# Patient Record
Sex: MALE | Race: WHITE | NOT HISPANIC OR LATINO | Employment: STUDENT | URBAN - METROPOLITAN AREA
[De-identification: names, ages, dates, MRNs, and addresses within clinical notes are randomized per-mention and may not be internally consistent; named-entity substitution may affect disease eponyms.]

---

## 2017-08-18 ENCOUNTER — TRANSFERRED RECORDS (OUTPATIENT)
Dept: HEALTH INFORMATION MANAGEMENT | Facility: CLINIC | Age: 27
End: 2017-08-18

## 2017-10-03 ENCOUNTER — TRANSFERRED RECORDS (OUTPATIENT)
Dept: HEALTH INFORMATION MANAGEMENT | Facility: CLINIC | Age: 27
End: 2017-10-03

## 2017-11-03 ENCOUNTER — TRANSFERRED RECORDS (OUTPATIENT)
Dept: HEALTH INFORMATION MANAGEMENT | Facility: CLINIC | Age: 27
End: 2017-11-03

## 2018-01-03 ENCOUNTER — TRANSFERRED RECORDS (OUTPATIENT)
Dept: HEALTH INFORMATION MANAGEMENT | Facility: CLINIC | Age: 28
End: 2018-01-03

## 2018-01-25 ENCOUNTER — TRANSFERRED RECORDS (OUTPATIENT)
Dept: HEALTH INFORMATION MANAGEMENT | Facility: CLINIC | Age: 28
End: 2018-01-25

## 2018-05-17 ENCOUNTER — OFFICE VISIT (OUTPATIENT)
Dept: INTERNAL MEDICINE | Facility: CLINIC | Age: 28
End: 2018-05-17
Payer: COMMERCIAL

## 2018-05-17 ENCOUNTER — RADIANT APPOINTMENT (OUTPATIENT)
Dept: GENERAL RADIOLOGY | Facility: CLINIC | Age: 28
End: 2018-05-17
Attending: NURSE PRACTITIONER
Payer: COMMERCIAL

## 2018-05-17 VITALS
HEART RATE: 74 BPM | TEMPERATURE: 98 F | BODY MASS INDEX: 20.79 KG/M2 | OXYGEN SATURATION: 97 % | RESPIRATION RATE: 16 BRPM | WEIGHT: 156.9 LBS | HEIGHT: 73 IN | DIASTOLIC BLOOD PRESSURE: 72 MMHG | SYSTOLIC BLOOD PRESSURE: 100 MMHG

## 2018-05-17 DIAGNOSIS — M79.672 LEFT FOOT PAIN: ICD-10-CM

## 2018-05-17 DIAGNOSIS — Z00.00 HEALTHCARE MAINTENANCE: Primary | ICD-10-CM

## 2018-05-17 DIAGNOSIS — M25.542 ARTHRALGIA OF BOTH HANDS: ICD-10-CM

## 2018-05-17 DIAGNOSIS — M25.541 ARTHRALGIA OF BOTH HANDS: ICD-10-CM

## 2018-05-17 DIAGNOSIS — B36.0 TINEA VERSICOLOR: ICD-10-CM

## 2018-05-17 DIAGNOSIS — M25.552 HIP PAIN, LEFT: ICD-10-CM

## 2018-05-17 LAB
CRP SERPL-MCNC: <2.9 MG/L (ref 0–8)
ERYTHROCYTE [DISTWIDTH] IN BLOOD BY AUTOMATED COUNT: 12.3 % (ref 10–15)
HCT VFR BLD AUTO: 41.5 % (ref 40–53)
HGB BLD-MCNC: 14.3 G/DL (ref 13.3–17.7)
MCH RBC QN AUTO: 31.2 PG (ref 26.5–33)
MCHC RBC AUTO-ENTMCNC: 34.5 G/DL (ref 31.5–36.5)
MCV RBC AUTO: 91 FL (ref 78–100)
PLATELET # BLD AUTO: 198 10E9/L (ref 150–450)
RBC # BLD AUTO: 4.58 10E12/L (ref 4.4–5.9)
WBC # BLD AUTO: 3.6 10E9/L (ref 4–11)

## 2018-05-17 PROCEDURE — 80061 LIPID PANEL: CPT | Performed by: NURSE PRACTITIONER

## 2018-05-17 PROCEDURE — 80048 BASIC METABOLIC PNL TOTAL CA: CPT | Performed by: NURSE PRACTITIONER

## 2018-05-17 PROCEDURE — 36415 COLL VENOUS BLD VENIPUNCTURE: CPT | Performed by: NURSE PRACTITIONER

## 2018-05-17 PROCEDURE — 99213 OFFICE O/P EST LOW 20 MIN: CPT | Mod: 25 | Performed by: NURSE PRACTITIONER

## 2018-05-17 PROCEDURE — 99385 PREV VISIT NEW AGE 18-39: CPT | Performed by: NURSE PRACTITIONER

## 2018-05-17 PROCEDURE — 86431 RHEUMATOID FACTOR QUANT: CPT | Performed by: NURSE PRACTITIONER

## 2018-05-17 PROCEDURE — 73630 X-RAY EXAM OF FOOT: CPT | Mod: LT

## 2018-05-17 PROCEDURE — 73130 X-RAY EXAM OF HAND: CPT | Mod: LT

## 2018-05-17 PROCEDURE — 84443 ASSAY THYROID STIM HORMONE: CPT | Performed by: NURSE PRACTITIONER

## 2018-05-17 PROCEDURE — 86140 C-REACTIVE PROTEIN: CPT | Performed by: NURSE PRACTITIONER

## 2018-05-17 PROCEDURE — 85027 COMPLETE CBC AUTOMATED: CPT | Performed by: NURSE PRACTITIONER

## 2018-05-17 RX ORDER — KETOCONAZOLE 20 MG/ML
SHAMPOO TOPICAL
Qty: 120 ML | Refills: 1 | Status: SHIPPED | OUTPATIENT
Start: 2018-05-17

## 2018-05-17 NOTE — LETTER
May 21, 2018      Ricky Campoverde  [address] [facility] [address]        Dear ,    We are writing to inform you of your test results.    Your recent lab results and xrays of hands and foot were normal.  Please have MRI of hip forwarded to us for any further evaluation.     Resulted Orders   CBC with platelets   Result Value Ref Range    WBC 3.6 (L) 4.0 - 11.0 10e9/L    RBC Count 4.58 4.4 - 5.9 10e12/L    Hemoglobin 14.3 13.3 - 17.7 g/dL    Hematocrit 41.5 40.0 - 53.0 %    MCV 91 78 - 100 fl    MCH 31.2 26.5 - 33.0 pg    MCHC 34.5 31.5 - 36.5 g/dL    RDW 12.3 10.0 - 15.0 %    Platelet Count 198 150 - 450 10e9/L      Comment:      Results confirmed by repeat test   Basic metabolic panel   Result Value Ref Range    Sodium 140 133 - 144 mmol/L    Potassium 4.0 3.4 - 5.3 mmol/L    Chloride 106 94 - 109 mmol/L    Carbon Dioxide 28 20 - 32 mmol/L    Anion Gap 6 3 - 14 mmol/L    Glucose 88 70 - 99 mg/dL    Urea Nitrogen 10 7 - 30 mg/dL    Creatinine 0.82 0.66 - 1.25 mg/dL    GFR Estimate >90 >60 mL/min/1.7m2      Comment:      Non  GFR Calc    GFR Estimate If Black >90 >60 mL/min/1.7m2      Comment:       GFR Calc    Calcium 9.2 8.5 - 10.1 mg/dL   Lipid Profile   Result Value Ref Range    Cholesterol 135 <200 mg/dL    Triglycerides 42 <150 mg/dL    HDL Cholesterol 58 >39 mg/dL    LDL Cholesterol Calculated 69 <100 mg/dL      Comment:      Desirable:       <100 mg/dl    Non HDL Cholesterol 77 <130 mg/dL   TSH with free T4 reflex   Result Value Ref Range    TSH 1.29 0.40 - 4.00 mU/L   Rheumatoid factor   Result Value Ref Range    Rheumatoid Factor <20 <20 IU/mL   CRP inflammation   Result Value Ref Range    CRP Inflammation <2.9 0.0 - 8.0 mg/L       If you have any questions or concerns, please call the clinic at the number listed above.       Sincerely,        Kristan Caro NP

## 2018-05-17 NOTE — PROGRESS NOTES
SUBJECTIVE:   CC: Ricky Campoverde is an 27 year old male who presents for preventative health visit.     Physical   Annual:     Getting at least 3 servings of Calcium per day::  Yes    Bi-annual eye exam::  NO    Dental care twice a year::  NO    Sleep apnea or symptoms of sleep apnea::  None    Diet::  Regular (no restrictions)    Frequency of exercise::  2-3 days/week    Duration of exercise::  30-45 minutes    Taking medications regularly::  Yes    Medication side effects::  None    Additional concerns today::  YES                Musculoskeletal problem/pain      Duration: 2-3 years    Description  Location: R hip and L foot and bilateral hands    Intensity:  mild, moderate    Accompanying signs and symptoms: none    History  Previous similar problem: YES  Previous evaluation:  MRI R hip and orthopedic evaluation in [place], labral tear    Precipitating or alleviating factors:  Trauma or overuse: no   Aggravating factors include: none    Therapies tried and outcome: nothing      Today's PHQ-2 Score:   PHQ-2 ( 1999 [employer]) 5/17/2018   Q1: Little interest or pleasure in doing things 0   Q2: Feeling down, depressed or hopeless 1   PHQ-2 Score 1   Q1: Little interest or pleasure in doing things Not at all   Q2: Feeling down, depressed or hopeless Several days   PHQ-2 Score 1       Abuse: Current or Past(Physical, Sexual or Emotional)- No  Do you feel safe in your environment - Yes    Social History   Substance Use Topics     Smoking status: Former Smoker     Types: Cigars     Smokeless tobacco: Former User      Comment: occas cigar     Alcohol use Yes      Comment: occas     Alcohol Use 5/17/2018   If you drink alcohol do you typically have greater than 3 drinks per day OR greater than 7 drinks per week? No       Last PSA: No results found for: PSA    Reviewed orders with patient. Reviewed health maintenance and updated orders accordingly - Yes  BP Readings from Last 3 Encounters:   05/17/18 100/72   08/22/12 100/56    07/05/12 120/62    Wt Readings from Last 3 Encounters:   05/17/18 156 lb 14.4 oz (71.2 kg)   08/22/12 159 lb (72.1 kg)   07/05/12 163 lb (73.9 kg)                  Patient Active Problem List   Diagnosis     CARDIOVASCULAR SCREENING; LDL GOAL LESS THAN 160     Contact dermatitis due to poison ivy     Tinea versicolor     Joint pain     Left foot pain     Hip pain, left     Past Surgical History:   Procedure Laterality Date     NO HISTORY OF SURGERY         Social History   Substance Use Topics     Smoking status: Former Smoker     Types: Cigars     Smokeless tobacco: Former User      Comment: occas cigar     Alcohol use Yes      Comment: 2 drinks 2-3 x weekly     Family History   Problem Relation Age of Onset     Hypertension Maternal Grandmother      Cancer - colorectal Maternal Grandmother      Hypertension Maternal Grandfather      CANCER Paternal Grandmother      OSTEOPOROSIS Paternal Grandmother      CANCER Paternal Grandfather      Cardiovascular Paternal Grandfather          Current Outpatient Prescriptions   Medication Sig Dispense Refill     ketoconazole (NIZORAL) 2 % shampoo Apply to the affected area and wash off after 5 minutes. 120 mL 1     NO ACTIVE MEDICATIONS   0       Reviewed and updated as needed this visit by clinical staff  Tobacco  Allergies  Meds  Med Hx  Surg Hx  Fam Hx  Soc Hx        Reviewed and updated as needed this visit by Provider            Review of Systems  CONSTITUTIONAL: NEGATIVE for fever, chills, change in weight  INTEGUMENTARY/SKIN: POSITIVE for h/o tinea vesicolor  ENT: NEGATIVE for ear, mouth and throat problems  RESP: NEGATIVE for significant cough or SOB  CV: NEGATIVE for chest pain, palpitations or peripheral edema  GI: NEGATIVE for nausea, abdominal pain, heartburn, or change in bowel habits   male: negative for dysuria, hematuria, decreased urinary stream, erectile dysfunction, urethral discharge  NEURO: NEGATIVE for weakness, dizziness or  "paresthesias  PSYCHIATRIC: NEGATIVE for changes in mood or affect    OBJECTIVE:   /72 (BP Location: Right arm, Patient Position: Sitting, Cuff Size: Adult Large)  Pulse 74  Temp 98  F (36.7  C) (Oral)  Resp 16  Ht 6' 0.5\" (1.842 m)  Wt 156 lb 14.4 oz (71.2 kg)  SpO2 97%  BMI 20.99 kg/m2    Physical Exam  GENERAL: healthy, alert and no distress  NECK: no adenopathy, no asymmetry, masses, or scars and thyroid normal to palpation  RESP: lungs clear to auscultation - no rales, rhonchi or wheezes  CV: regular rate and rhythm, normal S1 S2, no S3 or S4, no murmur, click or rub, no peripheral edema and peripheral pulses strong  ABDOMEN: soft, nontender, no hepatosplenomegaly, no masses and bowel sounds normal  MS: no gross musculoskeletal defects noted, no edema  MS: no synovitis bilat hands, fingers  SKIN: no suspicious lesions or rashes    ASSESSMENT/PLAN:       ICD-10-CM    1. Healthcare maintenance Z00.00 CBC with platelets     Basic metabolic panel     Lipid Profile   2. Arthralgia of both hands M25.541 TSH with free T4 reflex    M25.542 Rheumatoid factor     CRP inflammation     XR Hand Bilateral G/E 3 Views   3. Left foot pain M79.672 Rheumatoid factor     XR Foot Left G/E 3 Views   4. Hip pain, left M25.552    5. Tinea versicolor B36.0 ketoconazole (NIZORAL) 2 % shampoo       COUNSELING:   Reviewed preventive health counseling, as reflected in patient instructions         reports that he has quit smoking. His smoking use included Cigars. He has quit using smokeless tobacco.    Estimated body mass index is 20.99 kg/(m^2) as calculated from the following:    Height as of this encounter: 6' 0.5\" (1.842 m).    Weight as of this encounter: 156 lb 14.4 oz (71.2 kg).       Counseling Resources:  ATP IV Guidelines  Pooled Cohorts Equation Calculator  FRAX Risk Assessment  ICSI Preventive Guidelines  Dietary Guidelines for Americans, 2010  USDA's MyPlate  ASA Prophylaxis  Lung CA Screening    Kristan Garcia " STARR Caro  [facility]  Answers for HPI/ROS submitted by the patient on 5/17/2018   PHQ-2 Score: 1

## 2018-05-17 NOTE — MR AVS SNAPSHOT
After Visit Summary   5/17/2018    Ricky Campoverde    MRN: [id number]           Patient Information     Date Of Birth          1990        Visit Information        Provider Department      5/17/2018 9:40 AM Kristan Caro NP [facility]        Today's Diagnoses     Healthcare maintenance    -  1    Arthralgia of both hands        Left foot pain        Hip pain, left        Tinea versicolor          Care Instructions      Preventive Health Recommendations  Male Ages 26 - 39    Yearly exam:             See your health care provider every year in order to  o   Review health changes.   o   Discuss preventive care.    o   Review your medicines if your doctor has prescribed any.    You should be tested each year for STDs (sexually transmitted diseases), if you re at risk.     After age 35, talk to your provider about cholesterol testing. If you are at risk for heart disease, have your cholesterol tested at least every 5 years.     If you are at risk for diabetes, you should have a diabetes test (fasting glucose).  Shots: Get a flu shot each year. Get a tetanus shot every 10 years.     Nutrition:    Eat at least 5 servings of fruits and vegetables daily.     Eat whole-grain bread, whole-wheat pasta and brown rice instead of white grains and rice.     Talk to your provider about Calcium and Vitamin D.     Lifestyle    Exercise for at least 150 minutes a week (30 minutes a day, 5 days a week). This will help you control your weight and prevent disease.     Limit alcohol to one drink per day.     No smoking.     Wear sunscreen to prevent skin cancer.     See your dentist every six months for an exam and cleaning.             Follow-ups after your visit        Future tests that were ordered for you today     Open Future Orders        Priority Expected Expires Ordered    XR Foot Left G/E 3 Views Routine 5/17/2018 5/17/2019 5/17/2018    XR Hand Bilateral G/E 3 Views Routine 5/17/2018  "5/17/2019 5/17/2018            Who to contact     If you have questions or need follow up information about today's clinic visit or your schedule please contact [facility] directly at [phone].  Normal or non-critical lab and imaging results will be communicated to you by MyChart, letter or phone within 4 business days after the clinic has received the results. If you do not hear from us within 7 days, please contact the clinic through [employer] or phone. If you have a critical or abnormal lab result, we will notify you by phone as soon as possible.  Submit refill requests through [employer] or call your pharmacy and they will forward the refill request to us. Please allow 3 business days for your refill to be completed.          Additional Information About Your Visit        [employer] Information     [employer] gives you secure access to your electronic health record. If you see a primary care provider, you can also send messages to your care team and make appointments. If you have questions, please call your primary care clinic.  If you do not have a primary care provider, please call [phone] and they will assist you.        Care EveryWhere ID     This is your Care EveryWhere ID. This could be used by other organizations to access your [place] medical records  [id number]        Your Vitals Were     Pulse Temperature Respirations Height Pulse Oximetry BMI (Body Mass Index)    74 98  F (36.7  C) (Oral) 16 6' 0.5\" (1.842 m) 97% 20.99 kg/m2       Blood Pressure from Last 3 Encounters:   05/17/18 100/72   08/22/12 100/56   07/05/12 120/62    Weight from Last 3 Encounters:   05/17/18 156 lb 14.4 oz (71.2 kg)   08/22/12 159 lb (72.1 kg)   07/05/12 163 lb (73.9 kg)              We Performed the Following     Basic metabolic panel     CBC with platelets     CRP inflammation     Lipid Profile     Rheumatoid factor     TSH with free T4 reflex          Today's Medication Changes          These changes are " accurate as of 5/17/18 10:32 AM.  If you have any questions, ask your nurse or doctor.               Start taking these medicines.        Dose/Directions    ketoconazole 2 % shampoo   Commonly known as:  NIZORAL   Used for:  Tinea versicolor   Started by:  Kristan Caro, STARR        Apply to the affected area and wash off after 5 minutes.   Quantity:  120 mL   Refills:  1            Where to get your medicines      These medications were sent to [facility] [address] [place] [address] [id number] [place] [address]  [address] [facility] [address]     Phone:  [phone]     ketoconazole 2 % shampoo                Primary Care Provider Office Phone # Fax #    Luke Priest [phone] [address] [facility] [address]        Equal Access to Services     [facility]: Hadii robyn ku hadasho Soomaali, waaxda luqadaha, qaybta kaalmada adeegyada, waxay antoniin hayivyn dian beltran . So [facility] [phone].    ATENCIÓN: Si habla español, tiene a orozco disposición servicios gratuitos de asistencia lingüística. Llame al [phone].    We comply with applicable federal civil rights laws and [place] laws. We do not discriminate on the basis of race, color, national origin, age, disability, sex, sexual orientation, or gender identity.            Thank you!     Thank you for choosing [facility]  for your care. Our goal is always to provide you with excellent care. Hearing back from our patients is one way we can continue to improve our services. Please take a few minutes to complete the written survey that you may receive in the mail after your visit with us. Thank you!             Your Updated Medication List - Protect others around you: Learn how to safely use, store and throw away your medicines at www.disposemymeds.org.          This list is accurate as of 5/17/18 10:32 AM.  Always use your most recent med list.                    Brand Name Dispense Instructions for use Diagnosis    ketoconazole 2 % shampoo    NIZORAL    120 mL    Apply to the affected area and wash off after 5 minutes.    Tinea versicolor       NO ACTIVE MEDICATIONS

## 2018-05-18 LAB
ANION GAP SERPL CALCULATED.3IONS-SCNC: 6 MMOL/L (ref 3–14)
BUN SERPL-MCNC: 10 MG/DL (ref 7–30)
CALCIUM SERPL-MCNC: 9.2 MG/DL (ref 8.5–10.1)
CHLORIDE SERPL-SCNC: 106 MMOL/L (ref 94–109)
CHOLEST SERPL-MCNC: 135 MG/DL
CO2 SERPL-SCNC: 28 MMOL/L (ref 20–32)
CREAT SERPL-MCNC: 0.82 MG/DL (ref 0.66–1.25)
GFR SERPL CREATININE-BSD FRML MDRD: >90 ML/MIN/1.7M2
GLUCOSE SERPL-MCNC: 88 MG/DL (ref 70–99)
HDLC SERPL-MCNC: 58 MG/DL
LDLC SERPL CALC-MCNC: 69 MG/DL
NONHDLC SERPL-MCNC: 77 MG/DL
POTASSIUM SERPL-SCNC: 4 MMOL/L (ref 3.4–5.3)
RHEUMATOID FACT SER NEPH-ACNC: <20 IU/ML (ref 0–20)
SODIUM SERPL-SCNC: 140 MMOL/L (ref 133–144)
TRIGL SERPL-MCNC: 42 MG/DL
TSH SERPL DL<=0.005 MIU/L-ACNC: 1.29 MU/L (ref 0.4–4)

## 2018-05-23 ENCOUNTER — OFFICE VISIT (OUTPATIENT)
Dept: FAMILY MEDICINE | Facility: CLINIC | Age: 28
End: 2018-05-23
Payer: COMMERCIAL

## 2018-05-23 VITALS
DIASTOLIC BLOOD PRESSURE: 80 MMHG | SYSTOLIC BLOOD PRESSURE: 120 MMHG | OXYGEN SATURATION: 99 % | HEART RATE: 74 BPM | HEIGHT: 73 IN

## 2018-05-23 DIAGNOSIS — H00.012 HORDEOLUM EXTERNUM OF RIGHT LOWER EYELID: Primary | ICD-10-CM

## 2018-05-23 PROCEDURE — 67700 BLEPHAROTOMY DRG ABSC EYELID: CPT | Mod: 52 | Performed by: FAMILY MEDICINE

## 2018-05-23 RX ORDER — POLYMYXIN B SULFATE AND TRIMETHOPRIM 1; 10000 MG/ML; [USP'U]/ML
1 SOLUTION OPHTHALMIC
Qty: 1 BOTTLE | Refills: 0 | Status: SHIPPED | OUTPATIENT
Start: 2018-05-23 | End: 2018-06-14

## 2018-05-23 NOTE — LETTER
5/23/2018         RE: Ricky Campoverde  [address]  [place] [address]        Dear Colleague,    Thank you for referring your patient, Ricky Campoverde, to the [facility]. Please see a copy of my visit note below.    [facility] - PRIMARY CARE SKIN    CC : Lesion(s)  SUBJECTIVE:                                                    Ricky Campoverde is a 27 year old male who presents to clinic today because of a stye on the right eye beginning May 8th. It is enlarging and becoming more painful. He has been applying warm washcloths twice daily since onset. He denies any change of vision beyond obstruction from the stye.    His father's wedding is in four days, and he requests treatment.  Sister has also had a stye in the past with lancing treatment.    Personal history of skin cancer : NO.  Family history of skin cancer : NO.  Autoimmune : ? Lupus in mother    Occupation : freelance  (both indoor & outdoor).    Refer to electronic medical record (EMR) for past medical history and medications.    INTEGUMENTARY/SKIN: POSITIVE for changing lesion  EYES: NEGATIVE for vision changes  ROS : 14 point review of systems was negative except the symptoms listed above in the HPI.    This document serves as a record of the services and decisions personally performed and made by Nieves Posada MD. It was created on her behalf by Saroj Morley, a trained medical scribe.  The creation of this document is based on the scribe's personal observations and the provider's statements to the medical scribe.  Saroj Morley, May 23, 2018 3:49 PM      OBJECTIVE:                                                    GENERAL: healthy, alert and no distress  SKIN: Nunez Skin Type - II.  Face were examined. The dermatoscope was used to help evaluate pigmented lesions.  Skin Pertinent Findings:  Right eye : 7 mm in size, raised, erythematous lesion on right medial lower eyelid consistent with stye.  Name :  Incision and drainage  Location(s) : right eye.  Completed by : Nieves Posada MD  Note : Discussed risks of the excision procedure, including pain, infection, scarring, hypopigmentation, hyperpigmentation and potential for recurrence or need for re-treatment. Discussed that definitive removal may require referral to plastic surgeon. Benefits of treatment and alternative treatments were also discussed.    During this procedure, the universal protocol was utilized. The patient's identity was confirmed by no less than two patient identifiers, correct procedure was verified, correct site was verified and marked as applicable and a final pause was completed.    Sterile technique was used throughout the procedure. The skin was cleaned and prepped with alcohol. Stab incision was performed with an #11 blade. White purulent drainage was expressed.     Direct pressure was applied for hemostasis. No bleeding was present upon the completion of the procedure. Gauze was applied. Patient tolerated the procedure well and was discharged in satisfactory condition.    Diagnostic Test Results:  none     MDM\: because of his father's wedding in 5 days, the stye was drained.      ASSESSMENT:                                                      Encounter Diagnosis   Name Primary?     Hordeolum externum of right lower eyelid Yes          PLAN:                                                    Patient Instructions   FUTURE APPOINTMENTS  Follow up as needed.    Continue with warm compresses 10-15 minutes a day.    TOPICAL ANTIBIOTIC  Apply 1 drop of trimethoprim-polymyxin B (Polytrim) every 3-4 hours for 7 days to right eye.        PROCEDURES:                                                    None.    TT : 20 minutes.  CT : 15 minutes.      The information in this document, created by the medical scribe for me, accurately reflects the services I personally performed and the decisions made by me. I have reviewed and approved this document  for accuracy prior to leaving the patient care area.  Nieves Posada MD May 23, 2018 3:49 PM  [facility]    Again, thank you for allowing me to participate in the care of your patient.        Sincerely,        Blessing Posada MD

## 2018-05-23 NOTE — PATIENT INSTRUCTIONS
FUTURE APPOINTMENTS  Follow up as needed.    Continue with warm compresses 10-15 minutes a day.    TOPICAL ANTIBIOTIC  Apply 1 drop of trimethoprim-polymyxin B (Polytrim) every 3-4 hours for 7 days to right eye.

## 2018-05-23 NOTE — PROGRESS NOTES
[facility] - PRIMARY CARE SKIN    CC : Lesion(s)  SUBJECTIVE:                                                    Ricky Campoverde is a 27 year old male who presents to clinic today because of a stye on the right eye beginning May 8th. It is enlarging and becoming more painful. He has been applying warm washcloths twice daily since onset. He denies any change of vision beyond obstruction from the stye.    His father's wedding is in four days, and he requests treatment.  Sister has also had a stye in the past with lancing treatment.    Personal history of skin cancer : NO.  Family history of skin cancer : NO.  Autoimmune : ? Lupus in mother    Occupation : freelance  (both indoor & outdoor).    Refer to electronic medical record (EMR) for past medical history and medications.    INTEGUMENTARY/SKIN: POSITIVE for changing lesion  EYES: NEGATIVE for vision changes  ROS : 14 point review of systems was negative except the symptoms listed above in the HPI.    This document serves as a record of the services and decisions personally performed and made by Nieves Posada MD. It was created on her behalf by Saroj Morley, a trained medical scribe.  The creation of this document is based on the scribe's personal observations and the provider's statements to the medical scribe.  Saroj Morley, May 23, 2018 3:49 PM      OBJECTIVE:                                                    GENERAL: healthy, alert and no distress  SKIN: Nunez Skin Type - II.  Face were examined. The dermatoscope was used to help evaluate pigmented lesions.  Skin Pertinent Findings:  Right eye : 7 mm in size, raised, erythematous lesion on right medial lower eyelid consistent with stye.  Name : Incision and drainage  Location(s) : right eye.  Completed by : Nieves Posada MD  Note : Discussed risks of the excision procedure, including pain, infection, scarring, hypopigmentation, hyperpigmentation and potential for recurrence or need for  re-treatment. Discussed that definitive removal may require referral to plastic surgeon. Benefits of treatment and alternative treatments were also discussed.    During this procedure, the universal protocol was utilized. The patient's identity was confirmed by no less than two patient identifiers, correct procedure was verified, correct site was verified and marked as applicable and a final pause was completed.    Sterile technique was used throughout the procedure. The skin was cleaned and prepped with alcohol. Stab incision was performed with an #11 blade. White purulent drainage was expressed.     Direct pressure was applied for hemostasis. No bleeding was present upon the completion of the procedure. Gauze was applied. Patient tolerated the procedure well and was discharged in satisfactory condition.    Diagnostic Test Results:  none     MDM\: because of his father's wedding in 5 days, the stye was drained.      ASSESSMENT:                                                      Encounter Diagnosis   Name Primary?     Hordeolum externum of right lower eyelid Yes          PLAN:                                                    Patient Instructions   FUTURE APPOINTMENTS  Follow up as needed.    Continue with warm compresses 10-15 minutes a day.    TOPICAL ANTIBIOTIC  Apply 1 drop of trimethoprim-polymyxin B (Polytrim) every 3-4 hours for 7 days to right eye.        PROCEDURES:                                                    None.    TT : 20 minutes.  CT : 15 minutes.      The information in this document, created by the medical scribe for me, accurately reflects the services I personally performed and the decisions made by me. I have reviewed and approved this document for accuracy prior to leaving the patient care area.  Nieves Posada MD May 23, 2018 3:49 PM  [facility]

## 2018-05-23 NOTE — MR AVS SNAPSHOT
After Visit Summary   5/23/2018    Ricky Campoverde    MRN: [id number]           Patient Information     Date Of Birth          1990        Visit Information        Provider Department      5/23/2018 4:00 PM [facility]        Today's Diagnoses     Hordeolum externum of right lower eyelid    -  1      Care Instructions    FUTURE APPOINTMENTS  Follow up as needed.    Continue with warm compresses 10-15 minutes a day.    TOPICAL ANTIBIOTIC  Apply 1 drop of trimethoprim-polymyxin B (Polytrim) every 3-4 hours for 7 days to right eye.          Follow-ups after your visit        Who to contact     If you have questions or need follow up information about today's clinic visit or your schedule please contact [facility] directly at [phone].  Normal or non-critical lab and imaging results will be communicated to you by MyChart, letter or phone within 4 business days after the clinic has received the results. If you do not hear from us within 7 days, please contact the clinic through [employer] or phone. If you have a critical or abnormal lab result, we will notify you by phone as soon as possible.  Submit refill requests through [employer] or call your pharmacy and they will forward the refill request to us. Please allow 3 business days for your refill to be completed.          Additional Information About Your Visit        MyChart Information     [employer] gives you secure access to your electronic health record. If you see a primary care provider, you can also send messages to your care team and make appointments. If you have questions, please call your primary care clinic.  If you do not have a primary care provider, please call [phone] and they will assist you.        Care EveryWhere ID     This is your Care EveryWhere ID. This could be used by other organizations to access your [facility] medical records  [id number]        Your Vitals  "Were     Pulse Height Pulse Oximetry             74 6' 0.5\" (1.842 m) 99%          Blood Pressure from Last 3 Encounters:   05/23/18 120/80   05/17/18 100/72   08/22/12 100/56    Weight from Last 3 Encounters:   05/17/18 156 lb 14.4 oz (71.2 kg)   08/22/12 159 lb (72.1 kg)   07/05/12 163 lb (73.9 kg)              Today, you had the following     No orders found for display         Today's Medication Changes          These changes are accurate as of 5/23/18  4:05 PM.  If you have any questions, ask your nurse or doctor.               Start taking these medicines.        Dose/Directions    trimethoprim-polymyxin b ophthalmic solution   Commonly known as:  POLYTRIM   Used for:  Hordeolum externum of right lower eyelid   Started by:  Blessing Posada MD        Dose:  1 drop   Apply 1 drop to eye every 3 hours for 7 days   Quantity:  1 Bottle   Refills:  0            Where to get your medicines      These medications were sent to [facility] [address] [place] [id number]     Phone:  [phone]     trimethoprim-polymyxin b ophthalmic solution                Primary Care Provider Office Phone # Fax #    Luke Priest [phone] [address] [facility] [address]        Equal Access to Services     [facility]: Hadii aad ku hadasho Soomaali, waaxda luqadaha, qaybta kaalmada adeegyada, waxay shanelle haybrenden shepherd. So [facility] [phone].    ATENCIÓN: Si habla español, tiene a orozco disposición servicios gratuitos de asistencia lingüística. Llame al [phone].    We comply with applicable federal civil rights laws and [place] laws. We do not discriminate on the basis of race, color, national origin, age, disability, sex, sexual orientation, or gender identity.            Thank you!     Thank you for choosing [facility]  for your care. Our goal is always " to provide you with excellent care. Hearing back from our patients is one way we can continue to improve our services. Please take a few minutes to complete the written survey that you may receive in the mail after your visit with us. Thank you!             Your Updated Medication List - Protect others around you: Learn how to safely use, store and throw away your medicines at www.disposemymeds.org.          This list is accurate as of 5/23/18  4:05 PM.  Always use your most recent med list.                   Brand Name Dispense Instructions for use Diagnosis    ketoconazole 2 % shampoo    NIZORAL    120 mL    Apply to the affected area and wash off after 5 minutes.    Tinea versicolor       NO ACTIVE MEDICATIONS           trimethoprim-polymyxin b ophthalmic solution    POLYTRIM    1 Bottle    Apply 1 drop to eye every 3 hours for 7 days    Hordeolum externum of right lower eyelid

## 2018-05-29 ENCOUNTER — OFFICE VISIT (OUTPATIENT)
Dept: PODIATRY | Facility: CLINIC | Age: 28
End: 2018-05-29
Payer: COMMERCIAL

## 2018-05-29 ENCOUNTER — HOSPITAL ENCOUNTER (OUTPATIENT)
Dept: MRI IMAGING | Facility: CLINIC | Age: 28
Discharge: HOME | End: 2018-05-29
Attending: PODIATRIST | Admitting: PODIATRIST
Payer: COMMERCIAL

## 2018-05-29 VITALS
DIASTOLIC BLOOD PRESSURE: 62 MMHG | WEIGHT: 160 LBS | SYSTOLIC BLOOD PRESSURE: 120 MMHG | HEIGHT: 72 IN | BODY MASS INDEX: 21.67 KG/M2

## 2018-05-29 DIAGNOSIS — M77.52 TENDINITIS OF LEFT FOOT: ICD-10-CM

## 2018-05-29 DIAGNOSIS — L84 SKIN CALLUS: ICD-10-CM

## 2018-05-29 DIAGNOSIS — M79.672 LEFT FOOT PAIN: ICD-10-CM

## 2018-05-29 DIAGNOSIS — M79.672 LEFT FOOT PAIN: Primary | ICD-10-CM

## 2018-05-29 PROCEDURE — 73718 MRI LOWER EXTREMITY W/O DYE: CPT | Mod: LT

## 2018-05-29 PROCEDURE — 99203 OFFICE O/P NEW LOW 30 MIN: CPT | Performed by: PODIATRIST

## 2018-05-29 NOTE — PATIENT INSTRUCTIONS
Please call to schedule your MRI/CT/Ultrasound/Arthrogram appointment.  The number is [phone].    For EMG (Motor Nerve Testing):  Please call [facility]:  [phone], option #2    Thank you for choosing [facility]!    DR. ALVAREZ'S CLINIC SCHEDULE  MONDAY AM - [place] TUESDAY - [place]   [address] [address] [place] [address] [place] [address]   [phone] / [phone] / [phone]       WEDNESDAY - [place] FRIDAY AM - [facility]   [address]   [place] [address] [place] [address]   [phone] / [phone]       FRIDAY PM - [place] SCHEDULE SURGERY: [phone]   [address] BILLING QUESTIONS: [phone]   [place] [address] AFTER HOURS: [phone] / [phone] APPOINTMENTS: [phone]     [employer] (CPL) [phone]         TENDONITIS   Tendons are the strong fibrous portions ofmuscles that attach to bones and allow the muscle to move a joint when it contracts. Tendons are very strong because they have a lot of force exerted on them. Sometimes tendons can become painful because they have suffered an acute injury, in which too much force was exerted at one time, or an overuse injury, in which a normal force was exerted too frequently or over a prolonged period of time. As a result, there is damage to the tendon and its surrounding soft tissue structures and they become inflammed. Because tendons do not have a great blood supply, they do not heal rapidly and the inflammation can become chronic.   Conservative treatment for tendinitis involves rest and anti-inflammatory measures. Ice is applied 15 minutes 2-3 times daily. Anti-inflammatory medications called NSAIDs (ibuprofen, example) can be taken provided they are used with caution, as they can lead to internal bleeding and increase the risk ofstroke and  heart attack. Sometimes topical nitroglycerin is prescribed to help with pain. Often your doctor will use a special shoe or removable walking cast to immobilize the tendon, allowing it to heal without further damage from use. These devices are very useful in helping tendons heal, but they may slow you down or make you feel like your hip, knee, or back are out ofalignment. This is temporary and should go away once you are out ofthe immobilization. You should not use a walking cast when showering or driving. Another option is Platelet Rich Plasma injections. (Normally done with a Sports and Orthorapedic doctor.   If conservative measures fail, your physician may need to surgically repair the tendon by removing any chronic inflammatory tissue and sewing it back together. Sometimes it is sewn to an adjacent tendon with similar function for support and sometimes it is lengthened. . Sometimes the bones around the tendon need to be realigned or reshaped to better support the tendon or prevent further damage. Your foot and ankle surgeon will discuss the specifics of your surgery with you, should you need it.    Towel stretch: Sit on a hard surface with your injured leg stretched out in front of you. Loop a towel around your toes and the ball of your foot and pull the towel toward your body keeping your leg straight. Hold this position for 15 to 30 seconds and then relax. Repeat 3 times. Then push the towel away with the ball of your foot. Repeat 3 times.  When you don't feel much of a stretch using the towel, you can start the standing calf stretch and the following exercises.  Standing calf stretch: Stand facing a wall with your hands on the wall at about eye level. Keep your injured leg back with your heel on the floor. Keep the other leg forward with the knee bent. Turn your back foot slightly inward (as if you were pigeon-toed). Slowly lean into the wall until you feel a stretch in the back of your calf. Hold the  stretch for 15 to 30 seconds. Return to the starting position. Repeat 3 times. Do this exercise several times each day.   Standing soleus stretch: Stand facing a wall with your hands on the wall at about chest height. Keep your injured leg back with your heel on the floor. Keep the other leg forward with the knee bent. Turn your back foot slightly inward (as if you were pigeon-toed). Bend your back knee slightly and gently lean into the wall until you feel a stretch in the lower calf of your injured leg. Hold the stretch for 15 to 30 seconds. Return to the starting position. Repeat 3 times.   Achilles stretch: Stand with the ball of one foot on a stair. Reach for the step below with your heel until you feel a stretch in the arch of your foot. Hold this position for 15 to 30 seconds and then relax. Repeat 3 times.   Heel raise: Balance yourself while standing behind a chair or counter. Using the chair or counter as a support to help you, raise your body up onto your toes and hold for 5 seconds. Then slowly lower yourself down without holding onto the support. (It's OK to keep holding onto the support if you need to.) When this exercise becomes less painful, try lowering yourself down on the injured leg only. Repeat 15 times. Do 2 sets of 15. Rest 30 seconds between sets.   Step-up: Stand with the foot of your injured leg on a support 3 to 5 inches high (like a small step or block of wood). Keep your other foot flat on the floor. Shift your weight onto the injured leg on the support. Straighten your injured leg as the other leg comes off the floor. Return to the starting position by bending your injured leg and slowly lowering your uninjured leg back to the floor. Do 2 sets of 15.   Resisted ankle eversion: Sit with both legs stretched out in front of you, with your feet about a shoulder's width apart. Tie a loop in one end of elastic tubing. Put the foot of your injured leg through the loop so that the tubing goes  around the arch of that foot and wraps around the outside of the other foot. Hold onto the other end of the tubing with your hand to provide tension. Turn the foot of your injured leg up and out. Make sure you keep your other foot still so that it will allow the tubing to stretch as you move the foot of your injured leg. Return to the starting position. Do 2 sets of 15.   Balance and reach exercises: Stand next to a chair with your injured leg farther from the chair. The chair will provide support if you need it. Stand on the foot of your injured leg and bend your knee slightly. Try to raise the arch of this foot while keeping your big toe on the floor. Keep your foot in this position. With the hand that is farther away from the chair, reach forward in front of you by bending at the waist. Avoid bending your knee any more as you do this. Repeat this 10 times. To make the exercise more challenging, reach farther in front of you. Do 2 sets of 10.  the same position as above. While keeping your arch height, reach the hand that is farther away from the chair across your body toward the chair. The farther you reach, the more challenging the exercise. Do 2 sets of 10.     Resisted ankle eversion: Sit with both legs stretched out in front of you, with your feet about a shoulder's width apart. Tie a loop in one end of elastic tubing. Put the foot of your injured leg through the loop so that the tubing goes around the arch of that foot and wraps around the outside of the other foot. Hold onto the other end of the tubing with your hand to provide tension. Turn the foot of your injured leg up and out. Make sure you keep your other foot still so that it will allow the tubing to stretch as you move the foot of your injured leg. Return to the starting position. Do 2 sets of 15.   If you have access to a wobble board, do the following exercises:  Wobble board exercises:   Stand on a wobble board with your feet shoulder  width apart. Rock the board forwards and backwards 30 times, then side to side 30 times. Hold on to a chair if you need support.   Rotate the wobble board around so that the edge of the board is in contact with the floor at all times. Do this 30 times in a clockwise and then a counterclockwise direction.   Balance on the wobble board for as long as you can without letting the edges touch the floor. Try to do this for 2 minutes without touching the floor.   Rotate the wobble board in clockwise and counterclockwise circles, but do not let the edge of the board touch the floor.   When you have mastered exercises A through D, try repeating them while standing on just your injured leg.   After you are able to do these exercises on one leg, try to do them with your eyes closed. Make sure you have something nearby to support you in case you lose your balance.    CALLUS / CORNS / IPKs  When there is excessive friction or pressure on the skin, the body responds by making the skin thicker to protect the deeper structures from becoming exposed. While this works well to protect the deeper structures, the thickened skin can increase pressure and pain.    CALLUS: Flat, diffuse thickening are simple calluses and they are usually caused by friction. Often these are the result of rubbing on a shoe or going barefoot.    CORNS: Calluses with a central core between the toes are called corns. These result from prominent joints on adjacent toes rubbing together. Theses are a symptom of bone malalignment and will always recur unless the underlying bones are addressed surgically.    IPKs: Calluses with a central core on the ball of the foot are usually IPKs (intractable plantar keratosis). These are caused by excessive pressure from the metatarsals, the bones that make up the ball of the foot. Often one of these bones is too long or too prominent.  Again, these will always recur unless the underlying bone issue is addressed. There is no  cure for these. They will either go away by themselves, recur, or more could develop.    ROUTINE MAINTENANCE  1. File them down with a pumice stone or callus file a couple times a week.   2. An electric callus removing device. Amope Pedi Perfect Electronic Pedicure Foot File and Callus Remover can be a good option.   3. Lotion can be applied to soften the callus. A urea based cream such as Kersal or Vanicream or thicker cream with shea butter are good options.  4. Toe spacers or toe covers can be used for corns, gel pads can be used for other lesions on the bottom of the foot.   If there is a surgical pathology noted, such as a prominent bone, often this needs to be addressed surgically to minimize recurrence. However, sometimes the lesion simply migrates to another spot after surgery, so it is not a guaranteed cure.             Body Mass Index (BMI)  Many things can cause foot and ankle problems. Foot structure, activity level, foot mechanics and injuries are common causes of pain.  One very important issue that often goes unmentioned, is body weight. Extra weight can cause increased stress on muscles, ligaments, bones and tendons.  Sometimes just a few extra pounds is all it takes to put one over her/his threshold. Without reducing that stress, it can be difficult to alleviate pain. Some people are uncomfortable addressing this issue, but we feel it is important for you to think about it. As Foot &  Ankle specialists, our job is addressing the lower extremity problem and possible causes. Regarding extra body weight, we encourage patients to discuss diet and weight management plans with their primary care doctors. It is this team approach that gives you the best opportunity for pain relief and getting you back on your feet.

## 2018-05-29 NOTE — MR AVS SNAPSHOT
After Visit Summary   5/29/2018    Ricky Campoverde    MRN: [id number]           Patient Information     Date Of Birth          1990        Visit Information        Provider Department      5/29/2018 11:30 AM Marian Troncoso DPM, Podiatry/Foot and Ankle Surgery [facility]        Today's Diagnoses     Left foot pain    -  1    Tendinitis of left foot        Skin callus          Care Instructions    Please call to schedule your MRI/CT/Ultrasound/Arthrogram appointment.  The number is [phone].    For EMG (Motor Nerve Testing):  Please call [facility]:  [phone], option #2    Thank you for choosing [facility]!    DR. TRONCOSO'S CLINIC SCHEDULE  MONDAY AM - [place] TUESDAY - [place]   [address] [address] [address] [address] [place], [place] [id number] [phone] / [phone] / [phone]       WEDNESDAY - [place] FRIDAY AM - [facility]   [address]   [place] [address] [place] [address]   [phone] / [phone]       FRIDAY PM - [place] SCHEDULE SURGERY: [phone]   [address] BILLING QUESTIONS: [phone]   [place] [address] AFTER HOURS: [phone]   [phone] / [phone] APPOINTMENTS: [phone]     Consumer Price Line (CPL) [phone]         TENDONITIS   Tendons are the strong fibrous portions ofmuscles that attach to bones and allow the muscle to move a joint when it contracts. Tendons are very strong because they have a lot of force exerted on them. Sometimes tendons can become painful because they have suffered an acute injury, in which too much force was exerted at one time, or an overuse injury, in which a normal force was exerted too frequently or over a prolonged period of time. As a result, there is damage to the tendon and its surrounding soft tissue structures and they become  inflammed. Because tendons do not have a great blood supply, they do not heal rapidly and the inflammation can become chronic.   Conservative treatment for tendinitis involves rest and anti-inflammatory measures. Ice is applied 15 minutes 2-3 times daily. Anti-inflammatory medications called NSAIDs (ibuprofen, example) can be taken provided they are used with caution, as they can lead to internal bleeding and increase the risk ofstroke and heart attack. Sometimes topical nitroglycerin is prescribed to help with pain. Often your doctor will use a special shoe or removable walking cast to immobilize the tendon, allowing it to heal without further damage from use. These devices are very useful in helping tendons heal, but they may slow you down or make you feel like your hip, knee, or back are out ofalignment. This is temporary and should go away once you are out ofthe immobilization. You should not use a walking cast when showering or driving. Another option is Platelet Rich Plasma injections. (Normally done with a Sports and Orthorapedic doctor.   If conservative measures fail, your physician may need to surgically repair the tendon by removing any chronic inflammatory tissue and sewing it back together. Sometimes it is sewn to an adjacent tendon with similar function for support and sometimes it is lengthened. . Sometimes the bones around the tendon need to be realigned or reshaped to better support the tendon or prevent further damage. Your foot and ankle surgeon will discuss the specifics of your surgery with you, should you need it.    Towel stretch: Sit on a hard surface with your injured leg stretched out in front of you. Loop a towel around your toes and the ball of your foot and pull the towel toward your body keeping your leg straight. Hold this position for 15 to 30 seconds and then relax. Repeat 3 times. Then push the towel away with the ball of your foot. Repeat 3 times.  When you don't feel much of a  stretch using the towel, you can start the standing calf stretch and the following exercises.  Standing calf stretch: Stand facing a wall with your hands on the wall at about eye level. Keep your injured leg back with your heel on the floor. Keep the other leg forward with the knee bent. Turn your back foot slightly inward (as if you were pigeon-toed). Slowly lean into the wall until you feel a stretch in the back of your calf. Hold the stretch for 15 to 30 seconds. Return to the starting position. Repeat 3 times. Do this exercise several times each day.   Standing soleus stretch: Stand facing a wall with your hands on the wall at about chest height. Keep your injured leg back with your heel on the floor. Keep the other leg forward with the knee bent. Turn your back foot slightly inward (as if you were pigeon-toed). Bend your back knee slightly and gently lean into the wall until you feel a stretch in the lower calf of your injured leg. Hold the stretch for 15 to 30 seconds. Return to the starting position. Repeat 3 times.   Achilles stretch: Stand with the ball of one foot on a stair. Reach for the step below with your heel until you feel a stretch in the arch of your foot. Hold this position for 15 to 30 seconds and then relax. Repeat 3 times.   Heel raise: Balance yourself while standing behind a chair or counter. Using the chair or counter as a support to help you, raise your body up onto your toes and hold for 5 seconds. Then slowly lower yourself down without holding onto the support. (It's OK to keep holding onto the support if you need to.) When this exercise becomes less painful, try lowering yourself down on the injured leg only. Repeat 15 times. Do 2 sets of 15. Rest 30 seconds between sets.   Step-up: Stand with the foot of your injured leg on a support 3 to 5 inches high (like a small step or block of wood). Keep your other foot flat on the floor. Shift your weight onto the injured leg on the support.  Straighten your injured leg as the other leg comes off the floor. Return to the starting position by bending your injured leg and slowly lowering your uninjured leg back to the floor. Do 2 sets of 15.   Resisted ankle eversion: Sit with both legs stretched out in front of you, with your feet about a shoulder's width apart. Tie a loop in one end of elastic tubing. Put the foot of your injured leg through the loop so that the tubing goes around the arch of that foot and wraps around the outside of the other foot. Hold onto the other end of the tubing with your hand to provide tension. Turn the foot of your injured leg up and out. Make sure you keep your other foot still so that it will allow the tubing to stretch as you move the foot of your injured leg. Return to the starting position. Do 2 sets of 15.   Balance and reach exercises: Stand next to a chair with your injured leg farther from the chair. The chair will provide support if you need it. Stand on the foot of your injured leg and bend your knee slightly. Try to raise the arch of this foot while keeping your big toe on the floor. Keep your foot in this position. With the hand that is farther away from the chair, reach forward in front of you by bending at the waist. Avoid bending your knee any more as you do this. Repeat this 10 times. To make the exercise more challenging, reach farther in front of you. Do 2 sets of 10.  the same position as above. While keeping your arch height, reach the hand that is farther away from the chair across your body toward the chair. The farther you reach, the more challenging the exercise. Do 2 sets of 10.     Resisted ankle eversion: Sit with both legs stretched out in front of you, with your feet about a shoulder's width apart. Tie a loop in one end of elastic tubing. Put the foot of your injured leg through the loop so that the tubing goes around the arch of that foot and wraps around the outside of the other foot.  Hold onto the other end of the tubing with your hand to provide tension. Turn the foot of your injured leg up and out. Make sure you keep your other foot still so that it will allow the tubing to stretch as you move the foot of your injured leg. Return to the starting position. Do 2 sets of 15.   If you have access to a wobble board, do the following exercises:  Wobble board exercises:   Stand on a wobble board with your feet shoulder width apart. Rock the board forwards and backwards 30 times, then side to side 30 times. Hold on to a chair if you need support.   Rotate the wobble board around so that the edge of the board is in contact with the floor at all times. Do this 30 times in a clockwise and then a counterclockwise direction.   Balance on the wobble board for as long as you can without letting the edges touch the floor. Try to do this for 2 minutes without touching the floor.   Rotate the wobble board in clockwise and counterclockwise circles, but do not let the edge of the board touch the floor.   When you have mastered exercises A through D, try repeating them while standing on just your injured leg.   After you are able to do these exercises on one leg, try to do them with your eyes closed. Make sure you have something nearby to support you in case you lose your balance.    CALLUS / CORNS / IPKs  When there is excessive friction or pressure on the skin, the body responds by making the skin thicker to protect the deeper structures from becoming exposed. While this works well to protect the deeper structures, the thickened skin can increase pressure and pain.    CALLUS: Flat, diffuse thickening are simple calluses and they are usually caused by friction. Often these are the result of rubbing on a shoe or going barefoot.    CORNS: Calluses with a central core between the toes are called corns. These result from prominent joints on adjacent toes rubbing together. Theses are a symptom of bone malalignment and  will always recur unless the underlying bones are addressed surgically.    IPKs: Calluses with a central core on the ball of the foot are usually IPKs (intractable plantar keratosis). These are caused by excessive pressure from the metatarsals, the bones that make up the ball of the foot. Often one of these bones is too long or too prominent.  Again, these will always recur unless the underlying bone issue is addressed. There is no cure for these. They will either go away by themselves, recur, or more could develop.    ROUTINE MAINTENANCE  1. File them down with a pumice stone or callus file a couple times a week.   2. An electric callus removing device. Amope Pedi Perfect Electronic Pedicure Foot File and Callus Remover can be a good option.   3. Lotion can be applied to soften the callus. A urea based cream such as Kersal or Vanicream or thicker cream with shea butter are good options.  4. Toe spacers or toe covers can be used for corns, gel pads can be used for other lesions on the bottom of the foot.   If there is a surgical pathology noted, such as a prominent bone, often this needs to be addressed surgically to minimize recurrence. However, sometimes the lesion simply migrates to another spot after surgery, so it is not a guaranteed cure.             Body Mass Index (BMI)  Many things can cause foot and ankle problems. Foot structure, activity level, foot mechanics and injuries are common causes of pain.  One very important issue that often goes unmentioned, is body weight. Extra weight can cause increased stress on muscles, ligaments, bones and tendons.  Sometimes just a few extra pounds is all it takes to put one over her/his threshold. Without reducing that stress, it can be difficult to alleviate pain. Some people are uncomfortable addressing this issue, but we feel it is important for you to think about it. As Foot &  Ankle specialists, our job is addressing the lower extremity problem and possible  "causes. Regarding extra body weight, we encourage patients to discuss diet and weight management plans with their primary care doctors. It is this team approach that gives you the best opportunity for pain relief and getting you back on your feet.                  Follow-ups after your visit        Future tests that were ordered for you today     Open Future Orders        Priority Expected Expires Ordered    MR Foot Left w/o Contrast Routine  5/29/2019 5/29/2018            Who to contact     If you have questions or need follow up information about today's clinic visit or your schedule please contact [facility] directly at [phone].  Normal or non-critical lab and imaging results will be communicated to you by [employer], letter or phone within 4 business days after the clinic has received the results. If you do not hear from us within 7 days, please contact the clinic through the[employer] or phone. If you have a critical or abnormal lab result, we will notify you by phone as soon as possible.  Submit refill requests through [employer] or call your pharmacy and they will forward the refill request to us. Please allow 3 business days for your refill to be completed.          Additional Information About Your Visit        MyChart Information     [employer] gives you secure access to your electronic health record. If you see a primary care provider, you can also send messages to your care team and make appointments. If you have questions, please call your primary care clinic.  If you do not have a primary care provider, please call [phone] and they will assist you.        Care EveryWhere ID     This is your Care EveryWhere ID. This could be used by other organizations to access your [facility] medical records  [id number]        Your Vitals Were     Height BMI (Body Mass Index)                6' 0.05\" (1.83 m) 21.67 kg/m2           Blood Pressure from Last 3 Encounters:   05/29/18 120/62   05/23/18 120/80 "   05/17/18 100/72    Weight from Last 3 Encounters:   05/29/18 160 lb (72.6 kg)   05/17/18 156 lb 14.4 oz (71.2 kg)   08/22/12 159 lb (72.1 kg)               Primary Care Provider Office Phone # Fax #    Luke Priest [phone] [address] [facility] [address]        Equal Access to Services     MARTIR NERI : Hadii aad ku hadasho Soomaali, waaxda luqadaha, qaybta kaalmada adeegyada, waxay idiin hayaan adeeg kharash la'ivyn ah. So [facility] [phone].    ATENCIÓN: Si ganga chang, tiene a orozco disposición servicios gratuitos de asistencia lingüística. Llame al [phone].    We comply with applicable federal civil rights laws and [place] laws. We do not discriminate on the basis of race, color, national origin, age, disability, sex, sexual orientation, or gender identity.            Thank you!     Thank you for choosing [facility]  for your care. Our goal is always to provide you with excellent care. Hearing back from our patients is one way we can continue to improve our services. Please take a few minutes to complete the written survey that you may receive in the mail after your visit with us. Thank you!             Your Updated Medication List - Protect others around you: Learn how to safely use, store and throw away your medicines at www.disposemymeds.org.          This list is accurate as of 5/29/18 11:47 AM.  Always use your most recent med list.                   Brand Name Dispense Instructions for use Diagnosis    ketoconazole 2 % shampoo    NIZORAL    120 mL    Apply to the affected area and wash off after 5 minutes.    Tinea versicolor       NO ACTIVE MEDICATIONS           trimethoprim-polymyxin b ophthalmic solution    POLYTRIM    1 Bottle    Apply 1 drop to eye every 3 hours for 7 days    Hordeolum externum of right lower eyelid

## 2018-05-29 NOTE — PROGRESS NOTES
PATIENT HISTORY:   Ricky Campoverde is a 27 year old male who presents to clinic for pain to left great toe. Notes that it has been going on for 11 months. Banged it on a rock back then has had been sore since. Worse with increase activity. Currently 3/10 pain but can be 7/10 at its worst. Would like to know what is causing it and what can be done for it.     Review of Systems:  Patient denies fever, chills, rash, wound, stiffness, numbness, weakness, heart burn, blood in stool, chest pain with activity, calf pain when walking, shortness of breath with activity, chronic cough, easy bleeding/bruising, swelling of ankles, excessive thirst, fatigue, depression, anxiety.  Patient admits to limping at times.     PAST MEDICAL HISTORY:   Past Medical History:   Diagnosis Date     Joint pain      Tinea versicolor         PAST SURGICAL HISTORY:   Past Surgical History:   Procedure Laterality Date     NO HISTORY OF SURGERY          MEDICATIONS:   Current Outpatient Prescriptions:      NO ACTIVE MEDICATIONS, , Disp: , Rfl: 0     ketoconazole (NIZORAL) 2 % shampoo, Apply to the affected area and wash off after 5 minutes. (Patient not taking: Reported on 5/29/2018), Disp: 120 mL, Rfl: 1     trimethoprim-polymyxin b (POLYTRIM) ophthalmic solution, Apply 1 drop to eye every 3 hours for 7 days (Patient not taking: Reported on 5/29/2018), Disp: 1 Bottle, Rfl: 0     ALLERGIES:    Allergies   Allergen Reactions     Cefzil [Cefprozil] Hives        SOCIAL HISTORY:   Social History     Social History     Marital status: Single     Spouse name: N/A     Number of children: N/A     Years of education: N/A     Occupational History     Not on file.     Social History Main Topics     Smoking status: Former Smoker     Types: Cigars     Smokeless tobacco: Former User      Comment: occas cigar     Alcohol use Yes      Comment: 2 drinks 2-3 x weekly     Drug use: No     Sexual activity: Not Currently     Partners: Female     Other Topics Concern      "Not on file     Social History Narrative        FAMILY HISTORY:   Family History   Problem Relation Age of Onset     Hypertension Maternal Grandmother      Cancer - colorectal Maternal Grandmother      Hypertension Maternal Grandfather      CANCER Paternal Grandmother      OSTEOPOROSIS Paternal Grandmother      CANCER Paternal Grandfather      Cardiovascular Paternal Grandfather         EXAM:Vitals: /62  Ht 6' 0.05\" (1.83 m)  Wt 160 lb (72.6 kg)  BMI 21.67 kg/m2  BMI= Body mass index is 21.67 kg/(m^2).    General appearance: Patient is alert and fully cooperative with history & exam.  No sign of distress is noted during the visit.     Psychiatric: Affect is pleasant & appropriate.  Patient appears motivated to improve health.     Respiratory: Breathing is regular & unlabored while sitting.     HEENT: Hearing is intact to spoken word.  Speech is clear.  No gross evidence of visual impairment that would impact ambulation.     Dermatologic: localized hyperkeratotic lesion plantar left 5th metatarsal head. No open leisons or signs of infection noted.      Vascular: DP & PT pulses are intact & regular bilaterally.  No significant edema or varicosities noted.  CFT and skin temperature is normal to both lower extremities.     Neurologic: Lower extremity sensation is intact to light touch.  No evidence of weakness or contracture in the lower extremities.  No evidence of neuropathy.     Musculoskeletal: Patient is ambulatory without assistive device or brace.  Pain with range of motion of the left interphalangeal joint. Muscle strength is 5/5.     Radiographs:  I personally reviewed the xrays. Non weight bearing. No fracture or malalignment. Increase in 1st and 2nd intermetatarsal angle. Accessory bone to left interphalangeal joint.      ASSESSMENT:    Left foot pain  Tendinitis of left foot  Skin callus     PLAN:  Reviewed patient[facility] chart in [facility].  Reviewed and discussed causes of tendonitis.  We discussed " treatments such as immobiliation, icing, stretching, heel lifts, orthotics, physical therapy, MRI.     Also, upon looking on the xray, there appears to be an accessory bone to the Interphalangeal joint where patient is having pain. Will order MRI.  If mri is negative, this bone may be contributing to pain and may need to be surgically removed. Will call him with results.        Marian Troncoso DPM, Podiatry/Foot and Ankle Surgery

## 2018-05-29 NOTE — LETTER
5/29/2018         RE: Ricky Campoverde  [address]  [facility] [address]        Dear Colleague,    Thank you for referring your patient, Ricky Campoverde, to the [facility]. Please see a copy of my visit note below.    PATIENT HISTORY:   Ricky Campoverde is a 27 year old male who presents to clinic for pain to left great toe. Notes that it has been going on for 11 months. Banged it on a rock back then has had been sore since. Worse with increase activity. Currently 3/10 pain but can be 7/10 at its worst. Would like to know what is causing it and what can be done for it.     Review of Systems:  Patient denies fever, chills, rash, wound, stiffness, numbness, weakness, heart burn, blood in stool, chest pain with activity, calf pain when walking, shortness of breath with activity, chronic cough, easy bleeding/bruising, swelling of ankles, excessive thirst, fatigue, depression, anxiety.  Patient admits to limping at times.     PAST MEDICAL HISTORY:   Past Medical History:   Diagnosis Date     Joint pain      Tinea versicolor         PAST SURGICAL HISTORY:   Past Surgical History:   Procedure Laterality Date     NO HISTORY OF SURGERY          MEDICATIONS:   Current Outpatient Prescriptions:      NO ACTIVE MEDICATIONS, , Disp: , Rfl: 0     ketoconazole (NIZORAL) 2 % shampoo, Apply to the affected area and wash off after 5 minutes. (Patient not taking: Reported on 5/29/2018), Disp: 120 mL, Rfl: 1     trimethoprim-polymyxin b (POLYTRIM) ophthalmic solution, Apply 1 drop to eye every 3 hours for 7 days (Patient not taking: Reported on 5/29/2018), Disp: 1 Bottle, Rfl: 0     ALLERGIES:    Allergies   Allergen Reactions     Cefzil [Cefprozil] Hives        SOCIAL HISTORY:   Social History     Social History     Marital status: Single     Spouse name: N/A     Number of children: N/A     Years of education: N/A     Occupational History     Not on file.     Social History Main Topics     Smoking status:  "Former Smoker     Types: Cigars     Smokeless tobacco: Former User      Comment: occas cigar     Alcohol use Yes      Comment: 2 drinks 2-3 x weekly     Drug use: No     Sexual activity: Not Currently     Partners: Female     Other Topics Concern     Not on file     Social History Narrative        FAMILY HISTORY:   Family History   Problem Relation Age of Onset     Hypertension Maternal Grandmother      Cancer - colorectal Maternal Grandmother      Hypertension Maternal Grandfather      CANCER Paternal Grandmother      OSTEOPOROSIS Paternal Grandmother      CANCER Paternal Grandfather      Cardiovascular Paternal Grandfather         EXAM:Vitals: /62  Ht 6' 0.05\" (1.83 m)  Wt 160 lb (72.6 kg)  BMI 21.67 kg/m2  BMI= Body mass index is 21.67 kg/(m^2).    General appearance: Patient is alert and fully cooperative with history & exam.  No sign of distress is noted during the visit.     Psychiatric: Affect is pleasant & appropriate.  Patient appears motivated to improve health.     Respiratory: Breathing is regular & unlabored while sitting.     HEENT: Hearing is intact to spoken word.  Speech is clear.  No gross evidence of visual impairment that would impact ambulation.     Dermatologic: localized hyperkeratotic lesion plantar left 5th metatarsal head. No open leisons or signs of infection noted.      Vascular: DP & PT pulses are intact & regular bilaterally.  No significant edema or varicosities noted.  CFT and skin temperature is normal to both lower extremities.     Neurologic: Lower extremity sensation is intact to light touch.  No evidence of weakness or contracture in the lower extremities.  No evidence of neuropathy.     Musculoskeletal: Patient is ambulatory without assistive device or brace.  Pain with range of motion of the left interphalangeal joint. Muscle strength is 5/5.     Radiographs:  I personally reviewed the xrays. Non weight bearing. No fracture or malalignment. Increase in 1st and 2nd " intermetatarsal angle. Accessory bone to left interphalangeal joint.      ASSESSMENT:    Left foot pain  Tendinitis of left foot  Skin callus     PLAN:  Reviewed patient's [facility] in [facility].  Reviewed and discussed causes of tendonitis.  We discussed treatments such as immobiliation, icing, stretching, heel lifts, orthotics, physical therapy, MRI.     Also, upon looking on the xray, there appears to be an accessory bone to the Interphalangeal joint where patient is having pain. Will order MRI.  If mri is negative, this bone may be contributing to pain and may need to be surgically removed. Will call him with results.        Marian Troncoso DPM, Podiatry/Foot and Ankle Surgery      Again, thank you for allowing me to participate in the care of your patient.        Sincerely,        Marian Troncoso DPM, Podiatry/Foot and Ankle Surgery

## 2018-06-05 ENCOUNTER — OFFICE VISIT (OUTPATIENT)
Dept: PODIATRY | Facility: CLINIC | Age: 28
End: 2018-06-05
Payer: COMMERCIAL

## 2018-06-05 VITALS
WEIGHT: 156 LBS | SYSTOLIC BLOOD PRESSURE: 122 MMHG | BODY MASS INDEX: 21.13 KG/M2 | HEIGHT: 72 IN | DIASTOLIC BLOOD PRESSURE: 60 MMHG

## 2018-06-05 DIAGNOSIS — G57.92 NEURITIS OF LEFT FOOT: ICD-10-CM

## 2018-06-05 DIAGNOSIS — Q74.2 ACCESSORY BONE OF FOOT: ICD-10-CM

## 2018-06-05 DIAGNOSIS — M79.672 LEFT FOOT PAIN: Primary | ICD-10-CM

## 2018-06-05 PROCEDURE — 99213 OFFICE O/P EST LOW 20 MIN: CPT | Mod: 25 | Performed by: PODIATRIST

## 2018-06-05 PROCEDURE — 20600 DRAIN/INJ JOINT/BURSA W/O US: CPT | Mod: TA | Performed by: PODIATRIST

## 2018-06-05 NOTE — PATIENT INSTRUCTIONS
"Thank you for choosing [facility]!    DR. ALVAREZ'S CLINIC SCHEDULE  MONDAY AM - [place] TUESDAY - [place]   [address] [address] [place] [address] [place] [address]   [phone] / [phone] / [phone]       WEDNESDAY - [place] FRIDAY AM - [facility]   [address]   [place] [address] [place] [address]   [phone] / [phone]       FRIDAY PM - [place] SCHEDULE SURGERY: [phone]   [address] BILLING QUESTIONS: [phone]   [place] [address] AFTER HOURS: [phone]   [phone] / [phone] APPOINTMENTS: [phone]     [employer] (CPL) [phone]       BONE SPUR  A bone spur (osteophyte) is a bony growth formed on normal bone. Most people think of something sharp when they think of a \"spur,\" but a bone spur is just extra bone. It s usually smooth, but it can cause wear and tear or pain if it presses or rubs on other bones or soft tissues such as ligaments, tendons, or nerves in the body. Common places for bone spurs include the spine, shoulders, hands, hips, knees, and feet.  CAUSES  A bone spur forms as the body tries to repair itself by building extra bone. It generally forms in response to pressure, rubbing, or stress that continues over a long period of time.  Some bone spurs form as part of the aging process. As we age, the slippery tissue called cartilage that covers the ends of the bones within joints breaks down and eventually wears away (osteoarthritis). Over time, this leads to pain and swelling and, in some cases, bone spurs forming along the edges of the joint. Bone spurs due to aging are especially common in the joints of the spine and feet.  Bone spurs also form in the feet in response to tight ligaments, to activities such as dancing and running that put stress on the feet, and to pressure from being overweight or from " "poorly fitting shoes. For example, the long ligament on the bottom of the foot (plantar fascia) can become stressed or tight and pull on the heel, causing the ligament to become inflamed (plantar fasciitis). As the bone tries to mend itself, a bone spur can form on the bottom of the heel (known as a \"heel spur\"). Pressure at the back of the heel from frequently wearing shoes that are too tight can cause a bone spur on the back of the heel. This is sometimes called a \"pump bump,\" because it is often seen in women who wear high heels.  SYMPTOMS  Many people have bone spurs without ever knowing it, because most bone spurs cause no symptoms. But if the bone spurs are pressing on other bones or tissues or are causing a muscle or tendon to rub, they can break that tissue down over time, causing swelling, pain, and tearing. Bone spurs in the foot can also cause corns and calluses when tissue builds up to provide added padding over the bone spur.  DIAGNOSIS  A bone spur is usually visible on an X-ray. But since most bone spurs do not cause problems, it would be unusual to take an X-ray just to see whether you have a bone spur. If you had an X-ray to evaluate one of the problems associated with bone spurs, such as arthritis, bone spurs would be visible on that X-ray.  TREATMENT  Bone spurs do not require treatment unless they are causing pain or damaging other tissues. When needed, treatment may be directed at the causes, the symptoms, or the bone spurs themselves.  Treatment directed at the cause of bone spurs may include weight loss to take some pressure off the joints (especially when osteoarthritis or plantar fasciitis is the cause) and stretching the affected area, such as the heel cord and bottom of the foot. Seeing a physical therapist for ultrasound or deep tissue massage may be helpful for plantar fasciitis or shoulder pain.  Treatment directed at symptoms could include rest, ice, stretching, and nonsteroidal " anti-inflammatory drugs (NSAIDs) such as ibuprofen. Education in how to protect your joints is helpful if you have osteoarthritis. If a bone spur is in your foot, changing footwear or adding padding or a shoe insert such as a heel cup or orthotic may help. If the bone spur is causing corns or calluses, padding the area or wearing different shoes can help. A podiatrist (foot doctor) may be consulted if corns and calluses become a bigger problem. If the bone spur continues to cause symptoms, your doctor may suggest a corticosteroid injection at the painful area to decrease pain and inflammation of the soft tissues next to the bone spur.  Sometimes the bone spurs themselves are treated. Bone spurs can be surgically removed or treated as part of a surgery to repair or replace a joint when osteoarthritis has caused considerable damage and deformity. Examples might include repair of a bunion or heel spur in the foot. At other times, fusion of a joint may be warrented to decrease pain.    POTENTIAL COMPLICATIONS OF FOOT & ANKLE SURGERY  Undergoing a surgical procedure involves a certain amount of risk. Risks of complications vary depending on the complexity of the surgery and how you take care of the surgical area during the healing process. Complications can range from minor infection to death. Some complications are temporary while others will be permanent.  Your surgeon weighs the risk of complications vs the potential benefit of undergoing surgery. You need to consider your tolerance for unexpected problems as you decide whether to undergo surgery.    Foot and Ankle surgery involves cutting skin, bone, ligaments, blood vessels and joints.  These structures heal well but not without consequence. Any break to the skin can lead to infection. A deep infection involves bones or joints which can be devastating. Deep infection can lead to amputation or could spread to other parts of your body. Most infections are minor and  easily treated with oral antibiotics. Infections are often times from bacteria already present on your skin. Proper care of the surgical site is an essential component of avoiding infection. Do not get the bandage wet and take proper care of external pins to avoid these problems.     Joint stiffness is inherent to any foot or ankle surgery. Joint surgery is a major component of reconstructive foot and ankle procedures. The ligaments and tissues around the joint are cut, and later repaired. Scare tissue limits joint mobility. This can be permanent but generally improves over the course of one year.    Surgery involves dissection around nerves. Visible nerves are moved out of the way while very small nerves are cut. Scar tissue develops around nerves and can lead to nerve pain, numbness, or neuromas. Nerve symptoms can be permanent. This can lead to numbness or sometimes hypersensitivity to touch and problems wearing shoes.    Bones do not always heal after surgery. Poor healing after a bone cut or joint fusion can lead to an extended period of casting or repeat surgery. Electronic bone stimulators are sometimes used to stimulate poor healing of bone. Nonunion is when joint fusion does not take.  This can occur as often as 10% of the time. Smoking doubles your risk of poor bone healing to 20%.    Bone grafting is sometimes necessary during the original or subsequent surgery. Bone is sometimes taken from other parts of your body or freeze dried bone from a bone bank from a bone bank or synthetic bone material might be used.    A scar is always present after foot and ankle surgery. The scar will be visible and could be sensitive. Some people develop excessive scarring, which cannot be controlled by the surgeon. Scars can be unsightly and can restrict joint mobility.    Blood clots can develop in the calf after surgery. Foot and ankle surgery is a predisposing factor for blood clots. The blood clot could break and travel  to your lung.  This condition can lead to death. Early warning signs could include calf swelling and pain, chest pain or shortness of breath. This is an emergency that requires immediate attention by a medical doctor!    Surgery will not necessarily create a pain-free foot. Even normal feet hurt. Crooked toes, bunions, neuromas, flat feet and arthritis should all be considered permanent conditions.  Ankle pain commonly requires multiple surgeries over a lifetime. Do not assume that having surgery will permanently fix your condition. You may need permanent alteration in shoes and activities to accommodate your foot and ankle problem.    Careful attention to post-operative recommendations will dramatically reduce your risk of complications. Proper dressing, wound care, elevation and rest will be essential to get the wound healed and minimize scarring. Strict attention to activity restrictions, such as non-weight bearing, or partial weight bearing is essential. Internal fixation devices may not resist the stress of walking. Some select surgeries allow the patient to walk, however this should be very minimal.    Despite these concerns, foot and ankle surgery leads to a high level of patient satisfaction. Your surgeon would not recommend surgery if he/she did not expect your foot to improve. Talk to your surgeon about any of the above issues.

## 2018-06-05 NOTE — MR AVS SNAPSHOT
"              After Visit Summary   6/5/2018    Ricky Campoverde    MRN: [id number]           Patient Information     Date Of Birth          1990        Visit Information        Provider Department      6/5/2018 10:45 AM Marian Troncoso DPM, Podiatry/Foot and Ankle Surgery [facility]        Care Instructions    Thank you for choosing [facility]!    DR. TRONCOSO'S CLINIC SCHEDULE  MONDAY AM - [place] TUESDAY - [place]   [address] [address] [place] [address] [place] [address]   [phone] / [phone] / [phone]       WEDNESDAY - [place] FRIDAY AM - [facility]   [address]   [place] [address] [place] [address]   [phone] / [phone]       FRIDAY PM - [place] SCHEDULE SURGERY: [phone]   [address] BILLING QUESTIONS: [phone]   [place] [address] AFTER HOURS: [phone] / [phone] APPOINTMENTS: [phone]     Consumer Price Line (CPL) [phone]       BONE SPUR  A bone spur (osteophyte) is a bony growth formed on normal bone. Most people think of something sharp when they think of a \"spur,\" but a bone spur is just extra bone. It s usually smooth, but it can cause wear and tear or pain if it presses or rubs on other bones or soft tissues such as ligaments, tendons, or nerves in the body. Common places for bone spurs include the spine, shoulders, hands, hips, knees, and feet.  CAUSES  A bone spur forms as the body tries to repair itself by building extra bone. It generally forms in response to pressure, rubbing, or stress that continues over a long period of time.  Some bone spurs form as part of the aging process. As we age, the slippery tissue called cartilage that covers the ends of the bones within joints breaks down and eventually wears away (osteoarthritis). Over time, this leads to pain and swelling and, " "in some cases, bone spurs forming along the edges of the joint. Bone spurs due to aging are especially common in the joints of the spine and feet.  Bone spurs also form in the feet in response to tight ligaments, to activities such as dancing and running that put stress on the feet, and to pressure from being overweight or from poorly fitting shoes. For example, the long ligament on the bottom of the foot (plantar fascia) can become stressed or tight and pull on the heel, causing the ligament to become inflamed (plantar fasciitis). As the bone tries to mend itself, a bone spur can form on the bottom of the heel (known as a \"heel spur\"). Pressure at the back of the heel from frequently wearing shoes that are too tight can cause a bone spur on the back of the heel. This is sometimes called a \"pump bump,\" because it is often seen in women who wear high heels.  SYMPTOMS  Many people have bone spurs without ever knowing it, because most bone spurs cause no symptoms. But if the bone spurs are pressing on other bones or tissues or are causing a muscle or tendon to rub, they can break that tissue down over time, causing swelling, pain, and tearing. Bone spurs in the foot can also cause corns and calluses when tissue builds up to provide added padding over the bone spur.  DIAGNOSIS  A bone spur is usually visible on an X-ray. But since most bone spurs do not cause problems, it would be unusual to take an X-ray just to see whether you have a bone spur. If you had an X-ray to evaluate one of the problems associated with bone spurs, such as arthritis, bone spurs would be visible on that X-ray.  TREATMENT  Bone spurs do not require treatment unless they are causing pain or damaging other tissues. When needed, treatment may be directed at the causes, the symptoms, or the bone spurs themselves.  Treatment directed at the cause of bone spurs may include weight loss to take some pressure off the joints (especially when " osteoarthritis or plantar fasciitis is the cause) and stretching the affected area, such as the heel cord and bottom of the foot. Seeing a physical therapist for ultrasound or deep tissue massage may be helpful for plantar fasciitis or shoulder pain.  Treatment directed at symptoms could include rest, ice, stretching, and nonsteroidal anti-inflammatory drugs (NSAIDs) such as ibuprofen. Education in how to protect your joints is helpful if you have osteoarthritis. If a bone spur is in your foot, changing footwear or adding padding or a shoe insert such as a heel cup or orthotic may help. If the bone spur is causing corns or calluses, padding the area or wearing different shoes can help. A podiatrist (foot doctor) may be consulted if corns and calluses become a bigger problem. If the bone spur continues to cause symptoms, your doctor may suggest a corticosteroid injection at the painful area to decrease pain and inflammation of the soft tissues next to the bone spur.  Sometimes the bone spurs themselves are treated. Bone spurs can be surgically removed or treated as part of a surgery to repair or replace a joint when osteoarthritis has caused considerable damage and deformity. Examples might include repair of a bunion or heel spur in the foot. At other times, fusion of a joint may be warrented to decrease pain.    POTENTIAL COMPLICATIONS OF FOOT & ANKLE SURGERY  Undergoing a surgical procedure involves a certain amount of risk. Risks of complications vary depending on the complexity of the surgery and how you take care of the surgical area during the healing process. Complications can range from minor infection to death. Some complications are temporary while others will be permanent.  Your surgeon weighs the risk of complications vs the potential benefit of undergoing surgery. You need to consider your tolerance for unexpected problems as you decide whether to undergo surgery.    Foot and Ankle surgery involves  cutting skin, bone, ligaments, blood vessels and joints.  These structures heal well but not without consequence. Any break to the skin can lead to infection. A deep infection involves bones or joints which can be devastating. Deep infection can lead to amputation or could spread to other parts of your body. Most infections are minor and easily treated with oral antibiotics. Infections are often times from bacteria already present on your skin. Proper care of the surgical site is an essential component of avoiding infection. Do not get the bandage wet and take proper care of external pins to avoid these problems.     Joint stiffness is inherent to any foot or ankle surgery. Joint surgery is a major component of reconstructive foot and ankle procedures. The ligaments and tissues around the joint are cut, and later repaired. Scare tissue limits joint mobility. This can be permanent but generally improves over the course of one year.    Surgery involves dissection around nerves. Visible nerves are moved out of the way while very small nerves are cut. Scar tissue develops around nerves and can lead to nerve pain, numbness, or neuromas. Nerve symptoms can be permanent. This can lead to numbness or sometimes hypersensitivity to touch and problems wearing shoes.    Bones do not always heal after surgery. Poor healing after a bone cut or joint fusion can lead to an extended period of casting or repeat surgery. Electronic bone stimulators are sometimes used to stimulate poor healing of bone. Nonunion is when joint fusion does not take.  This can occur as often as 10% of the time. Smoking doubles your risk of poor bone healing to 20%.    Bone grafting is sometimes necessary during the original or subsequent surgery. Bone is sometimes taken from other parts of your body or freeze dried bone from a bone bank from a bone bank or synthetic bone material might be used.    A scar is always present after foot and ankle surgery. The  scar will be visible and could be sensitive. Some people develop excessive scarring, which cannot be controlled by the surgeon. Scars can be unsightly and can restrict joint mobility.    Blood clots can develop in the calf after surgery. Foot and ankle surgery is a predisposing factor for blood clots. The blood clot could break and travel to your lung.  This condition can lead to death. Early warning signs could include calf swelling and pain, chest pain or shortness of breath. This is an emergency that requires immediate attention by a medical doctor!    Surgery will not necessarily create a pain-free foot. Even normal feet hurt. Crooked toes, bunions, neuromas, flat feet and arthritis should all be considered permanent conditions.  Ankle pain commonly requires multiple surgeries over a lifetime. Do not assume that having surgery will permanently fix your condition. You may need permanent alteration in shoes and activities to accommodate your foot and ankle problem.    Careful attention to post-operative recommendations will dramatically reduce your risk of complications. Proper dressing, wound care, elevation and rest will be essential to get the wound healed and minimize scarring. Strict attention to activity restrictions, such as non-weight bearing, or partial weight bearing is essential. Internal fixation devices may not resist the stress of walking. Some select surgeries allow the patient to walk, however this should be very minimal.    Despite these concerns, foot and ankle surgery leads to a high level of patient satisfaction. Your surgeon would not recommend surgery if he/she did not expect your foot to improve. Talk to your surgeon about any of the above issues.              Follow-ups after your visit        Who to contact     If you have questions or need follow up information about today's clinic visit or your schedule please contact [facility] directly at [phone].  Normal or  non-critical lab and imaging results will be communicated to you by MyChart, letter or phone within 4 business days after the clinic has received the results. If you do not hear from us within 7 days, please contact the clinic through [employer] or phone. If you have a critical or abnormal lab result, we will notify you by phone as soon as possible.  Submit refill requests through [employer] or call your pharmacy and they will forward the refill request to us. Please allow 3 business days for your refill to be completed.          Additional Information About Your Visit        [employer] Information     [employer] gives you secure access to your electronic health record. If you see a primary care provider, you can also send messages to your care team and make appointments. If you have questions, please call your primary care clinic.  If you do not have a primary care provider, please call [phone] and they will assist you.        Care EveryWhere ID     This is your Care EveryWhere ID. This could be used by other organizations to access your [place] medical records  [id number]        Your Vitals Were     Height BMI (Body Mass Index)                6' (1.829 m) 21.16 kg/m2           Blood Pressure from Last 3 Encounters:   06/05/18 122/60   05/29/18 120/62   05/23/18 120/80    Weight from Last 3 Encounters:   06/05/18 156 lb (70.8 kg)   05/29/18 160 lb (72.6 kg)   05/17/18 156 lb 14.4 oz (71.2 kg)              Today, you had the following     No orders found for display       Primary Care Provider Office Phone # Fax #    Luke Priest [phone] [address] [facility] [address]        Equal Access to Services     MARTIR NERI : Hadii robyn delarosa hadshoshana Soalexi, waaxda luqadaha, qaybta kaalmada dilcia marroquin. So [facility] [phone].    ATENCIÓN: Si habla español, tiene a orozco disposición servicios gratuitos de asistencia lingüística. Llame al [phone].    We  comply with applicable federal civil rights laws and [place] laws. We do not discriminate on the basis of race, color, national origin, age, disability, sex, sexual orientation, or gender identity.            Thank you!     Thank you for choosing [facility]  for your care. Our goal is always to provide you with excellent care. Hearing back from our patients is one way we can continue to improve our services. Please take a few minutes to complete the written survey that you may receive in the mail after your visit with us. Thank you!             Your Updated Medication List - Protect others around you: Learn how to safely use, store and throw away your medicines at www.disposemymeds.org.          This list is accurate as of 6/5/18 11:07 AM.  Always use your most recent med list.                   Brand Name Dispense Instructions for use Diagnosis    ketoconazole 2 % shampoo    NIZORAL    120 mL    Apply to the affected area and wash off after 5 minutes.    Tinea versicolor       NO ACTIVE MEDICATIONS

## 2018-06-05 NOTE — PROGRESS NOTES
Podiatry / Foot and Ankle Surgery Progress Note    June 5, 2018    Subject: Patient was seen for follow up on mri results left foot. Still deep throbbing pain to toe.     Objective:  Vitals: /60  Ht 6' (1.829 m)  Wt 156 lb (70.8 kg)  BMI 21.16 kg/m2  BMI= Body mass index is 21.16 kg/(m^2).    General:  Patient is alert and orientated.  NAD  Dermatologic: localized hyperkeratotic lesion plantar left 5th metatarsal head. No open leisons or signs of infection noted.       Vascular: DP & PT pulses are intact & regular bilaterally.  No significant edema or varicosities noted.  CFT and skin temperature is normal to both lower extremities.      Neurologic: Lower extremity sensation is intact to light touch.  No evidence of weakness or contracture in the lower extremities.  No evidence of neuropathy.      Musculoskeletal: Patient is ambulatory without assistive device or brace.  Pain with range of motion of the left interphalangeal joint. Muscle strength is 5/5.      Radiographs:  I personally reviewed the xrays. Non weight bearing. No fracture or malalignment. Increase in 1st and 2nd intermetatarsal angle. Accessory bone to left interphalangeal joint.     MrI: negative for pathology.      ASSESSMENT:     Left foot pain  Accessory bone of foot  Neuritis of left foot      PLAN:  Reviewed patient's chart in [facility]. reviewed MRI results.      Talked about diagnostic injection to see if this helped with pain as it could be nerve due to injury or irritation of the accessory bone at the interphalangeal joint.     We talked about surgical removal. Talked about risks including infection, numbness, continued pain, need for further surgery, blood loss, blood clotting. You will scar.    Will try injection today. If improves some, recommend bone spur removal. If no improvement. Talked about PT with TENS. Discussed we could remove it but it would not help neuritis pain.     PRocedure:  After verbal consent, the left 1st  interphalangeal joint was marked out.  The foot was prepped and draped using sterile technique.   1cc of 2% lidocaine plainwas injected into the left 1st interphalangeal joint .  Patient tolerated procedure and anesthesia well.        Marian Troncoso DPM, Podiatry/Foot and Ankle Surgery

## 2018-06-05 NOTE — LETTER
6/5/2018         RE: Ricky Campoverde  [address]  [facility] [address]        Dear Colleague,    Thank you for referring your patient, Ricky Campoverde, to the [facility]. Please see a copy of my visit note below.    Podiatry / Foot and Ankle Surgery Progress Note    June 5, 2018    Subject: Patient was seen for follow up on mri results left foot. Still deep throbbing pain to toe.     Objective:  Vitals: /60  Ht 6' (1.829 m)  Wt 156 lb (70.8 kg)  BMI 21.16 kg/m2  BMI= Body mass index is 21.16 kg/(m^2).    General:  Patient is alert and orientated.  NAD  Dermatologic: localized hyperkeratotic lesion plantar left 5th metatarsal head. No open leisons or signs of infection noted.       Vascular: DP & PT pulses are intact & regular bilaterally.  No significant edema or varicosities noted.  CFT and skin temperature is normal to both lower extremities.      Neurologic: Lower extremity sensation is intact to light touch.  No evidence of weakness or contracture in the lower extremities.  No evidence of neuropathy.      Musculoskeletal: Patient is ambulatory without assistive device or brace.  Pain with range of motion of the left interphalangeal joint. Muscle strength is 5/5.      Radiographs:  I personally reviewed the xrays. Non weight bearing. No fracture or malalignment. Increase in 1st and 2nd intermetatarsal angle. Accessory bone to left interphalangeal joint.     MrI: negative for pathology.      ASSESSMENT:      Left foot pain  Accessory bone of foot  Neuritis of left foot      PLAN:  Reviewed patient's chart in [facility]. reviewed MRI results.      Talked about diagnostic injection to see if this helped with pain as it could be nerve due to injury or irritation of the accessory bone at the interphalangeal joint.     We talked about surgical removal. Talked about risks including infection, numbness, continued pain, need for further surgery, blood loss, blood clotting. You will  scar.    Will try injection today. If improves some, recommend bone spur removal. If no improvement. Talked about PT with TENS. Discussed we could remove it but it would not help neuritis pain.     PRocedure:  After verbal consent, the left 1st interphalangeal joint was marked out.  The foot was prepped and draped using sterile technique.   1cc of 2% lidocaine plainwas injected into the left 1st interphalangeal joint .  Patient tolerated procedure and anesthesia well.        Marian Troncoso DPM, Podiatry/Foot and Ankle Surgery        Again, thank you for allowing me to participate in the care of your patient.        Sincerely,        Marian Troncoso DPM, Podiatry/Foot and Ankle Surgery

## 2018-06-12 ENCOUNTER — MYC MEDICAL ADVICE (OUTPATIENT)
Dept: PODIATRY | Facility: CLINIC | Age: 28
End: 2018-06-12

## 2018-06-12 DIAGNOSIS — M79.672 LEFT FOOT PAIN: Primary | ICD-10-CM

## 2018-06-12 DIAGNOSIS — G57.92 NEURITIS OF FOOT, LEFT: ICD-10-CM

## 2018-06-14 ENCOUNTER — OFFICE VISIT (OUTPATIENT)
Dept: FAMILY MEDICINE | Facility: CLINIC | Age: 28
End: 2018-06-14
Payer: COMMERCIAL

## 2018-06-14 VITALS — OXYGEN SATURATION: 98 % | SYSTOLIC BLOOD PRESSURE: 114 MMHG

## 2018-06-14 DIAGNOSIS — H00.012 HORDEOLUM EXTERNUM OF RIGHT LOWER EYELID: Primary | ICD-10-CM

## 2018-06-14 DIAGNOSIS — H00.12 CHALAZION OF RIGHT LOWER EYELID: ICD-10-CM

## 2018-06-14 PROCEDURE — 99213 OFFICE O/P EST LOW 20 MIN: CPT | Performed by: FAMILY MEDICINE

## 2018-06-14 RX ORDER — SULFACETAMIDE/PREDNISOLONE 10 %-0.2 %
2 SUSPENSION, DROPS(FINAL DOSAGE FORM)(ML) OPHTHALMIC (EYE) 3 TIMES DAILY
Qty: 3 ML | Refills: 0 | Status: SHIPPED | OUTPATIENT
Start: 2018-06-14 | End: 2018-06-21

## 2018-06-14 NOTE — PROGRESS NOTES
[facility] (formerly [facility] - PRIMARY CARE SKIN    CC : Lesion(s)  SUBJECTIVE:                                                    Ricky Campoverde is a 27 year old male who presents to clinic today for follow-up of a stye on the right eye beginning May 8th. Another palpable bump has developed on the right lower eyelid.    Current treatment : incision and drainage 5/23/18, polytrim ophthalmic solution. He has also used warm compresses.  Response to treatment : The treated area is still feeling tender, and he has noticed some drainage. The stye has been diminishing in size.    He denies a history of styes before this episode. No history of dandruff. No new products used. Sister has also had a stye in the past with lancing treatment.    Personal history of skin cancer : NO.  Family history of skin cancer : NO.  Autoimmune : ? Lupus in mother    Occupation : freelance  (both indoor & outdoor).    Refer to electronic medical record (EMR) for past medical history and medications.    INTEGUMENTARY/SKIN: POSITIVE for non-healing lesion  EYES: NEGATIVE for vision changes  ROS : 14 point review of systems was negative except the symptoms listed above in the HPI.    This document serves as a record of the services and decisions personally performed and made by Nieves Posada MD. It was created on her behalf by Saroj Morley, a trained medical scribe.  The creation of this document is based on the scribe's personal observations and the provider's statements to the medical scribe.  Saroj Morley, June 14, 2018 2:03 PM      OBJECTIVE:                                                    GENERAL: healthy, alert and no distress  SKIN: Nunez Skin Type - II.  Face were examined. The dermatoscope was used to help evaluate pigmented lesions.  Skin Pertinent Findings:  Right medial lower eyelid : 5 x 2 mm in size stye.    Right lower eyelid : 5 mm in size chalazion    Diagnostic Test Results:  none         ASSESSMENT:                                                       Encounter Diagnoses   Name Primary?     Hordeolum externum of right lower eyelid Yes     Chalazion of right lower eyelid           PLAN:                                                    Patient Instructions   FUTURE APPOINTMENTS  Follow up as needed if lesion develops increasing tenderness, size, bleeding, discharge, infection.  Ophthalmology referral can be considered.    Continue doing warm compresses.  After compresses, gently massage with a circular motion on the lower and upper right eyelids.  After massaging, wash with diluted Ayush & Ayush baby shampoo, water and face towel.    TOPICAL STEROID INSTRUCTIONS  Sulfacetamide-prednisolone (Blephamide) 10-0.2% ophthalmic solution.  Apply two drops in right eye three times a day for 7 days.    Discontinue use of polytrim.        PROCEDURES:                                                    None.    TT : 20 minutes.  CT : 15 minutes.      The information in this document, created by the medical scribe for me, accurately reflects the services I personally performed and the decisions made by me. I have reviewed and approved this document for accuracy prior to leaving the patient care area.  Nieves Posada MD June 14, 2018 2:21 PM  [facility]

## 2018-06-14 NOTE — PATIENT INSTRUCTIONS
FUTURE APPOINTMENTS  Follow up as needed if lesion develops increasing tenderness, size, bleeding, discharge, infection.  Ophthalmology referral can be considered.    Continue doing warm compresses.  After compresses, gently massage with a circular motion on the lower and upper right eyelids.  After massaging, wash with diluted Ayush & Ayush baby shampoo, water and face towel.    TOPICAL STEROID INSTRUCTIONS  Sulfacetamide-prednisolone (Blephamide) 10-0.2% ophthalmic solution.  Apply two drops in right eye three times a day for 7 days.    Discontinue use of polytrim.

## 2018-06-14 NOTE — LETTER
6/14/2018         RE: Ricky Campoverde  [address]  [place] [address]        Dear Colleague,    Thank you for referring your patient, Ricky Campoverde, to the [facility] [place]. Please see a copy of my visit note below.    [facility] - PRIMARY CARE SKIN    CC : Lesion(s)  SUBJECTIVE:                                                    Ricky Campoverde is a 27 year old male who presents to clinic today for follow-up of a stye on the right eye beginning May 8th. Another palpable bump has developed on the right lower eyelid.    Current treatment : incision and drainage 5/23/18, polytrim ophthalmic solution. He has also used warm compresses.  Response to treatment : The treated area is still feeling tender, and he has noticed some drainage. The stye has been diminishing in size.    He denies a history of styes before this episode. No history of dandruff. No new products used. Sister has also had a stye in the past with lancing treatment.    Personal history of skin cancer : NO.  Family history of skin cancer : NO.  Autoimmune : ? Lupus in mother    Occupation : freelance  (both indoor & outdoor).    Refer to electronic medical record (EMR) for past medical history and medications.    INTEGUMENTARY/SKIN: POSITIVE for non-healing lesion  EYES: NEGATIVE for vision changes  ROS : 14 point review of systems was negative except the symptoms listed above in the HPI.    This document serves as a record of the services and decisions personally performed and made by Nieves Posada MD. It was created on her behalf by Saroj Morley, a trained medical scribe.  The creation of this document is based on the scribe's personal observations and the provider's statements to the medical scribe.  Saroj Morley, June 14, 2018 2:03 PM      OBJECTIVE:                                                    GENERAL: healthy, alert and no distress  SKIN: Nunez Skin Type - II.  Face were examined. The dermatoscope  was used to help evaluate pigmented lesions.  Skin Pertinent Findings:  Right medial lower eyelid : 5 x 2 mm in size stye.    Right lower eyelid : 5 mm in size chalazion    Diagnostic Test Results:  none         ASSESSMENT:                                                      Encounter Diagnoses   Name Primary?     Hordeolum externum of right lower eyelid Yes     Chalazion of right lower eyelid           PLAN:                                                    Patient Instructions   FUTURE APPOINTMENTS  Follow up as needed if lesion develops increasing tenderness, size, bleeding, discharge, infection.  Ophthalmology referral can be considered.    Continue doing warm compresses.  After compresses, gently massage with a circular motion on the lower and upper right eyelids.  After massaging, wash with diluted Ayush & Ayush baby shampoo, water and face towel.    TOPICAL STEROID INSTRUCTIONS  Sulfacetamide-prednisolone (Blephamide) 10-0.2% ophthalmic solution.  Apply two drops in right eye three times a day for 7 days.    Discontinue use of polytrim.        PROCEDURES:                                                    None.    TT : 20 minutes.  CT : 15 minutes.      The information in this document, created by the medical scribe for me, accurately reflects the services I personally performed and the decisions made by me. I have reviewed and approved this document for accuracy prior to leaving the patient care area.  Nieves Posada MD June 14, 2018 2:21 PM  [facility]    Again, thank you for allowing me to participate in the care of your patient.        Sincerely,        Blessing Posada MD

## 2018-06-14 NOTE — MR AVS SNAPSHOT
After Visit Summary   6/14/2018    Ricky Campoverde    MRN: [id number]           Patient Information     Date Of Birth          1990        Visit Information        Provider Department      6/14/2018 2:00 PM Blessing Posada MD [facility]        Today's Diagnoses     Hordeolum externum of right lower eyelid    -  1    Chalazion of right lower eyelid          Care Instructions    FUTURE APPOINTMENTS  Follow up as needed if lesion develops increasing tenderness, size, bleeding, discharge, infection.  Ophthalmology referral can be considered.    Continue doing warm compresses.  After compresses, gently massage with a circular motion on the lower and upper right eyelids.  After massaging, wash with diluted Ayush & Ayush baby shampoo, water and face towel.    TOPICAL STEROID INSTRUCTIONS  Sulfacetamide-prednisolone (Blephamide) 10-0.2% ophthalmic solution.  Apply two drops in right eye three times a day for 7 days.    Discontinue use of polytrim.          Follow-ups after your visit        Who to contact     If you have questions or need follow up information about today's clinic visit or your schedule please [facility] directly at [phone].  Normal or non-critical lab and imaging results will be communicated to you by MyChart, letter or phone within 4 business days after the clinic has received the results. If you do not hear from us within 7 days, please contact the clinic through [employer] or phone. If you have a critical or abnormal lab result, we will notify you by phone as soon as possible.  Submit refill requests through [employer] or call your pharmacy and they will forward the refill request to us. Please allow 3 business days for your refill to be completed.          Additional Information About Your Visit        MyChart Information     Etherstack gives you secure access to your electronic health record. If you see a primary care provider,  you can also send messages to your care team and make appointments. If you have questions, please call your primary care clinic.  If you do not have a primary care provider, please call [phone] and they will assist you.        Care EveryWhere ID     This is your Care EveryWhere ID. This could be used by other organizations to access your [place] medical records  [id number]        Your Vitals Were     Pulse Oximetry                   98%            Blood Pressure from Last 3 Encounters:   06/14/18 (!) 114/0   06/05/18 122/60   05/29/18 120/62    Weight from Last 3 Encounters:   06/05/18 156 lb (70.8 kg)   05/29/18 160 lb (72.6 kg)   05/17/18 156 lb 14.4 oz (71.2 kg)              Today, you had the following     No orders found for display         Today's Medication Changes          These changes are accurate as of 6/14/18  2:30 PM.  If you have any questions, ask your nurse or doctor.               Start taking these medicines.        Dose/Directions    sulfacetamide-prednisoLONE 10-0.2 % Susp   Used for:  Hordeolum externum of right lower eyelid, Chalazion of right lower eyelid   Started by:  Blessing Posada MD        Dose:  2 drop   Apply 2 drops to eye 3 times daily for 7 days   Quantity:  3 mL   Refills:  0         Stop taking these medicines if you haven't already. Please contact your care team if you have questions.     NO ACTIVE MEDICATIONS   Stopped by:  Blessing Posada MD                Where to get your medicines      [facility] [address] [place] [address] [id number] [place] [address] AT [place] [address]  [address] [facility] [address]     Phone:  [phone]     sulfacetamide-prednisoLONE 10-0.2 % Susp                Primary Care Provider Office Phone # Fax #    Luke Priest [phone] [address] [facility] [address] [facility] [address]        Equal Access to Services     JONATHAN NERI AH: Victoria rubalcava  Nasraalexi, edwinda lushaneladaha, qaybta kamichoacano marroquin, dilcia biggs nayroxana lalingnicolette joao. So [facility] [phone].    ATENCIÓN: Si ganga chang, tiene a orozco disposición servicios gratuitos de asistencia lingüística. Delfina al [phone].    We comply with applicable federal civil rights laws and [place] laws. We do not discriminate on the basis of race, color, national origin, age, disability, sex, sexual orientation, or gender identity.            Thank you!     Thank you for choosing [facility] [place]  for your care. Our goal is always to provide you with excellent care. Hearing back from our patients is one way we can continue to improve our services. Please take a few minutes to complete the written survey that you may receive in the mail after your visit with us. Thank you!             Your Updated Medication List - Protect others around you: Learn how to safely use, store and throw away your medicines at www.disposemymeds.org.          This list is accurate as of 6/14/18  2:30 PM.  Always use your most recent med list.                   Brand Name Dispense Instructions for use Diagnosis    ketoconazole 2 % shampoo    NIZORAL    120 mL    Apply to the affected area and wash off after 5 minutes.    Tinea versicolor       sulfacetamide-prednisoLONE 10-0.2 % Susp     3 mL    Apply 2 drops to eye 3 times daily for 7 days    Hordeolum externum of right lower eyelid, Chalazion of right lower eyelid       trimethoprim-polymyxin b ophthalmic solution    POLYTRIM    1 Bottle    Apply 1 drop to eye every 3 hours for 7 days    Hordeolum externum of right lower eyelid

## 2018-06-18 ENCOUNTER — THERAPY VISIT (OUTPATIENT)
Dept: PHYSICAL THERAPY | Facility: CLINIC | Age: 28
End: 2018-06-18
Payer: COMMERCIAL

## 2018-06-18 DIAGNOSIS — M25.572 CHRONIC PAIN OF LEFT ANKLE: Primary | ICD-10-CM

## 2018-06-18 DIAGNOSIS — G89.29 CHRONIC PAIN OF LEFT ANKLE: Primary | ICD-10-CM

## 2018-06-18 DIAGNOSIS — M79.675 PAIN OF TOE OF LEFT FOOT: ICD-10-CM

## 2018-06-18 PROCEDURE — G0283 ELEC STIM OTHER THAN WOUND: HCPCS | Mod: GP | Performed by: PHYSICAL THERAPIST

## 2018-06-18 PROCEDURE — 97110 THERAPEUTIC EXERCISES: CPT | Mod: GP | Performed by: PHYSICAL THERAPIST

## 2018-06-18 PROCEDURE — 97161 PT EVAL LOW COMPLEX 20 MIN: CPT | Mod: GP | Performed by: PHYSICAL THERAPIST

## 2018-06-18 NOTE — MR AVS SNAPSHOT
After Visit Summary   6/18/2018    Ricky Campoverde    MRN: [id number]           Patient Information     Date Of Birth          1990        Visit Information        Provider Department      6/18/2018 1:00 PM Mateo Lopez, PT [facility] Physical Therapy        Today's Diagnoses     Chronic pain of left ankle    -  1    Pain of toe of left foot           Follow-ups after your visit        Your next 10 appointments already scheduled     Jun 21, 2018  2:20 PM CDT   SURESH Extremity with Mateo Lopez PT   [facility] Physical Therapy ([facility])    [address]  [facility] [address]   [phone]            Jun 25, 2018  1:40 PM CDT   SURESH Extremity with Mateo Lopez PT   [facility] Therapy ([facility])    [address]  [facility] [address] [phone]            Jun 28, 2018 11:50 AM CDT   SURESH Extremity with Mateo Lopez PT   [facility] Physical Therapy ([facility])    [address]  [facility] [address]   [phone]            Jul 02, 2018 11:10 AM CDT   SURESH Extremity with Mateo Lopez PT   [facility] Physical Therapy ([facility])    [address]  [facility] [address]   [phone]            Jul 05, 2018 11:50 AM CDT   SURESH Extremity with Mateo Lopez PT   [facility] Therapy ([facility])    [address]  [facility] [address]   [phone]              Who to contact     If you have questions or need follow up information about today's clinic visit or your schedule please contact [place] [facility] PHYSICAL THERAPY directly at [phone].  Normal or non-critical lab and imaging results will be communicated  to you by [employer], letter or phone within 4 business days after the clinic has received the results. If you do not hear from us within 7 days, please contact the clinic through [employer] or phone. If you have a critical or abnormal lab result, we will notify you by phone as soon as possible.  Submit refill requests through [employer] or call your pharmacy and they will forward the refill request to us. Please allow 3 business days for your refill to be completed.          Additional Information About Your Visit        [employer] Information     [employer] gives you secure access to your electronic health record. If you see a primary care provider, you can also send messages to your care team and make appointments. If you have questions, please call your primary care clinic.  If you do not have a primary care provider, please call [phone] and they will assist you.        Care EveryWhere ID     This is your Care EveryWhere ID. This could be used by other organizations to access your [place] medical records  [id number]         Blood Pressure from Last 3 Encounters:   06/14/18 (!) 114/0   06/05/18 122/60   05/29/18 120/62    Weight from Last 3 Encounters:   06/05/18 70.8 kg (156 lb)   05/29/18 72.6 kg (160 lb)   05/17/18 71.2 kg (156 lb 14.4 oz)              We Performed the Following     ELECTRIC STIMULATION THERAPY     HC PT EVAL, LOW COMPLEXITY     SURESH INITIAL EVAL REPORT     THERAPEUTIC EXERCISES        Primary Care Provider Office Phone # Fax #    Luke Priest [phone] [address] [facility] [address]        Equal Access to Services     JONATHAN NERI : Hadii aad ku hadasho Soalexi, waaxda luqadaha, qaybta kaalmada dilcia marroquin . So [facility] [phone].    ATENCIÓN: Si habla español, tiene a orozco disposición servicios gratuitos de asistencia lingüística. Llame al [phone].    We comply with applicable federal civil rights laws and [place] laws.  We do not discriminate on the basis of race, color, national origin, age, disability, sex, sexual orientation, or gender identity.            Thank you!     Thank you for choosing [place] [facility] PHYSICAL THERAPY  for your care. Our goal is always to provide you with excellent care. Hearing back from our patients is one way we can continue to improve our services. Please take a few minutes to complete the written survey that you may receive in the mail after your visit with us. Thank you!             Your Updated Medication List - Protect others around you: Learn how to safely use, store and throw away your medicines at www.disposemymeds.org.          This list is accurate as of 6/18/18  2:41 PM.  Always use your most recent med list.                   Brand Name Dispense Instructions for use Diagnosis    ketoconazole 2 % shampoo    NIZORAL    120 mL    Apply to the affected area and wash off after 5 minutes.    Tinea versicolor       sulfacetamide-prednisoLONE 10-0.2 % Susp     3 mL    Apply 2 drops to eye 3 times daily for 7 days    Hordeolum externum of right lower eyelid, Chalazion of right lower eyelid

## 2018-06-18 NOTE — PROGRESS NOTES
"[place] [facility] Initial Evaluation  Subjective:  Patient is a 27 year old male presenting with rehab left ankle/foot hpi. The history is provided by the patient. No  was used.   Ricky Campoverde is a 27 year old male with a left foot condition.  Condition occurred with:  Other reason (jamming left big toe one year ago).  Condition occurred: in the community.  This is a chronic condition  Pt reports having left big toe pain that has been present for 10 months after \"smasshing big toe on rock\".  MRI showed spur.  MD appt 6/5/18.  Pain increased as he is on his feet more throughout the day.  .    Site of Pain: plantar and dorsal surface of great toe.    Pain is described as aching and is intermittent and reported as 4/10.  Associated symptoms:  Loss of motion/stiffness. Pain is worse during the day and worse in the P.M..  Symptoms are exacerbated by activity, walking and weight bearing and relieved by nothing.  Since onset symptoms are unchanged.  Special tests:  MRI and x-ray.      General health as reported by patient is good.  Pertinent medical history includes:  Depression.  Medical allergies: no.  Other surgeries include:  None reported.  Current medications:  None as reported by patient.  Current occupation is Filmmaker  .  Patient is working in normal job without restrictions.  Primary job tasks include:  Prolonged sitting, prolonged standing and repetitive tasks.    Barriers include:  None as reported by patient.    Red flags:  None as reported by patient.                        Objective:    Gait:    Gait Type:  Normal   Assistive Devices:  None            Ankle/Foot Evaluation  ROM:  AROM is normal.  AROM:            Great toe flexion:  Left:  33     Right:  45  Great Toe Extension:  Left:  Symetrical     Right: symetrical  PROM:            Great Toe Flexion:  Left:  Symetrical     Right:  Symetrical    Great Toe Extension:  Left:    Symetrical     Right: " symetrical        LIGAMENT TESTING: normal              SPECIAL TESTS: normal    PALPATION: normal    EDEMA: normal          MOBILITY TESTING: normal                                                                General     ROS    Assessment/Plan:    Patient is a 27 year old male with L toe complaints.    Patient has the following significant findings with corresponding treatment plan.                Diagnosis 1:  L toe pain  Pain -  self management and education  Decreased ROM/flexibility - manual therapy and therapeutic exercise  Decreased strength - therapeutic exercise and therapeutic activities  Impaired muscle performance - neuro re-education  Decreased function - therapeutic activities    Therapy Evaluation Codes:   1) History comprised of:   Personal factors that impact the plan of care:      None.    Comorbidity factors that impact the plan of care are:      Depression.     Medications impacting care: None.  2) Examination of Body Systems comprised of:   Body structures and functions that impact the plan of care:      Toes.   Activity limitations that impact the plan of care are:      Walking.  3) Clinical presentation characteristics are:   Stable/Uncomplicated.  4) Decision-Making    Low complexity using standardized patient assessment instrument and/or measureable assessment of functional outcome.  Cumulative Therapy Evaluation is: Low complexity.    Previous and current functional limitations:  (See Goal Flow Sheet for this information)    Short term and Long term goals: (See Goal Flow Sheet for this information)     Communication ability:  Patient appears to be able to clearly communicate and understand verbal and written communication and follow directions correctly.  Treatment Explanation - The following has been discussed with the patient:   RX ordered/plan of care  Anticipated outcomes  Possible risks and side effects  This patient would benefit from PT intervention to resume normal activities.    Rehab potential is fair.    Frequency:  2 X week, once daily  Duration:  for 4 weeks  Discharge Plan:  Achieve all LTG.  Independent in home treatment program.  Reach maximal therapeutic benefit.    Please refer to the daily flowsheet for treatment today, total treatment time and time spent performing 1:1 timed codes.

## 2018-06-21 ENCOUNTER — THERAPY VISIT (OUTPATIENT)
Dept: PHYSICAL THERAPY | Facility: CLINIC | Age: 28
End: 2018-06-21
Payer: COMMERCIAL

## 2018-06-21 DIAGNOSIS — M25.572 CHRONIC PAIN OF LEFT ANKLE: ICD-10-CM

## 2018-06-21 DIAGNOSIS — M79.675 PAIN OF TOE OF LEFT FOOT: ICD-10-CM

## 2018-06-21 DIAGNOSIS — G89.29 CHRONIC PAIN OF LEFT ANKLE: ICD-10-CM

## 2018-06-21 PROCEDURE — 97110 THERAPEUTIC EXERCISES: CPT | Mod: GP | Performed by: PHYSICAL THERAPIST

## 2018-06-21 PROCEDURE — G0283 ELEC STIM OTHER THAN WOUND: HCPCS | Mod: GP | Performed by: PHYSICAL THERAPIST

## 2018-06-25 ENCOUNTER — THERAPY VISIT (OUTPATIENT)
Dept: PHYSICAL THERAPY | Facility: CLINIC | Age: 28
End: 2018-06-25
Payer: COMMERCIAL

## 2018-06-25 DIAGNOSIS — M25.572 CHRONIC PAIN OF LEFT ANKLE: ICD-10-CM

## 2018-06-25 DIAGNOSIS — G89.29 CHRONIC PAIN OF LEFT ANKLE: ICD-10-CM

## 2018-06-25 DIAGNOSIS — M79.675 PAIN OF TOE OF LEFT FOOT: ICD-10-CM

## 2018-06-25 PROCEDURE — G0283 ELEC STIM OTHER THAN WOUND: HCPCS | Mod: GP | Performed by: PHYSICAL THERAPIST

## 2018-06-25 PROCEDURE — 97110 THERAPEUTIC EXERCISES: CPT | Mod: GP | Performed by: PHYSICAL THERAPIST

## 2018-06-27 ENCOUNTER — MYC MEDICAL ADVICE (OUTPATIENT)
Dept: INTERNAL MEDICINE | Facility: CLINIC | Age: 28
End: 2018-06-27

## 2018-06-28 ENCOUNTER — THERAPY VISIT (OUTPATIENT)
Dept: PHYSICAL THERAPY | Facility: CLINIC | Age: 28
End: 2018-06-28
Payer: COMMERCIAL

## 2018-06-28 DIAGNOSIS — M79.675 PAIN OF TOE OF LEFT FOOT: ICD-10-CM

## 2018-06-28 DIAGNOSIS — G89.29 CHRONIC PAIN OF LEFT ANKLE: ICD-10-CM

## 2018-06-28 DIAGNOSIS — M25.572 CHRONIC PAIN OF LEFT ANKLE: ICD-10-CM

## 2018-06-28 PROCEDURE — 97110 THERAPEUTIC EXERCISES: CPT | Mod: GP | Performed by: PHYSICAL THERAPIST

## 2018-06-28 PROCEDURE — G0283 ELEC STIM OTHER THAN WOUND: HCPCS | Mod: GP | Performed by: PHYSICAL THERAPIST

## 2018-07-02 ENCOUNTER — THERAPY VISIT (OUTPATIENT)
Dept: PHYSICAL THERAPY | Facility: CLINIC | Age: 28
End: 2018-07-02
Payer: COMMERCIAL

## 2018-07-02 DIAGNOSIS — M79.675 PAIN OF TOE OF LEFT FOOT: ICD-10-CM

## 2018-07-02 DIAGNOSIS — M25.572 CHRONIC PAIN OF LEFT ANKLE: ICD-10-CM

## 2018-07-02 DIAGNOSIS — G89.29 CHRONIC PAIN OF LEFT ANKLE: ICD-10-CM

## 2018-07-02 PROCEDURE — G0283 ELEC STIM OTHER THAN WOUND: HCPCS | Mod: GP | Performed by: PHYSICAL THERAPIST

## 2018-07-05 ENCOUNTER — THERAPY VISIT (OUTPATIENT)
Dept: PHYSICAL THERAPY | Facility: CLINIC | Age: 28
End: 2018-07-05
Payer: COMMERCIAL

## 2018-07-05 DIAGNOSIS — M79.675 PAIN OF TOE OF LEFT FOOT: ICD-10-CM

## 2018-07-05 DIAGNOSIS — M25.572 CHRONIC PAIN OF LEFT ANKLE: ICD-10-CM

## 2018-07-05 DIAGNOSIS — G89.29 CHRONIC PAIN OF LEFT ANKLE: ICD-10-CM

## 2018-07-05 PROCEDURE — G0283 ELEC STIM OTHER THAN WOUND: HCPCS | Mod: GP | Performed by: PHYSICAL THERAPIST

## 2018-07-10 ENCOUNTER — THERAPY VISIT (OUTPATIENT)
Dept: PHYSICAL THERAPY | Facility: CLINIC | Age: 28
End: 2018-07-10
Payer: COMMERCIAL

## 2018-07-10 DIAGNOSIS — M79.675 PAIN OF TOE OF LEFT FOOT: ICD-10-CM

## 2018-07-10 DIAGNOSIS — G89.29 CHRONIC PAIN OF LEFT ANKLE: ICD-10-CM

## 2018-07-10 DIAGNOSIS — M25.572 CHRONIC PAIN OF LEFT ANKLE: ICD-10-CM

## 2018-07-10 PROCEDURE — G0283 ELEC STIM OTHER THAN WOUND: HCPCS | Mod: GP | Performed by: PHYSICAL THERAPIST

## 2018-07-10 NOTE — MR AVS SNAPSHOT
After Visit Summary   7/10/2018    Ricky Campoverde    MRN: [id number]           Patient Information     Date Of Birth          1990        Visit Information        Provider Department      7/10/2018 8:30 AM Mateo Lopez, PT [facility] Physical Therapy        Today's Diagnoses     Pain of toe of left foot        Chronic pain of left ankle           Follow-ups after your visit        Your next 10 appointments already scheduled     Jul 17, 2018 10:10 AM CDT   SURESH Extremity with Rigoberto Antunez PT   [facility] Therapy ([facility])    [address]  [facility] [address] [phone]              Who to contact     If you have questions or need follow up information about today's clinic visit or your schedule please contact [facility] directly at [phone].  Normal or non-critical lab and imaging results will be communicated to you by [employer], letter or phone within 4 business days after the clinic has received the results. If you do not hear from us within 7 days, please contact the clinic through [employer] or phone. If you have a critical or abnormal lab result, we will notify you by phone as soon as possible.  Submit refill requests through [employer] or call your pharmacy and they will forward the refill request to us. Please allow 3 business days for your refill to be completed.          Additional Information About Your Visit        MyChart Information     [employer] gives you secure access to your electronic health record. If you see a primary care provider, you can also send messages to your care team and make appointments. If you have questions, please call your primary care clinic.  If you do not have a primary care provider, please call [phone] and they will assist you.        Care EveryWhere ID     This is your Care EveryWhere ID.  This could be used by other organizations to access your [place] medical records  [id number]         Blood Pressure from Last 3 Encounters:   06/14/18 (!) 114/0   06/05/18 122/60   05/29/18 120/62    Weight from Last 3 Encounters:   06/05/18 70.8 kg (156 lb)   05/29/18 72.6 kg (160 lb)   05/17/18 71.2 kg (156 lb 14.4 oz)              We Performed the Following     ELECTRIC STIMULATION THERAPY        Primary Care Provider Office Phone # Fax #    Luke Priest [phone] [address] [facility] [address]        Equal Access to Services     JONATHAN NERI : Hadii robyn kruseo Soalexi, waaxda angy, qaybta kaalmada aderudi, dilcia beltran . So [facility] [phone].    ATENCIÓN: Si habla español, tiene a orozco disposición servicios gratuitos de asistencia lingüística. Llame al [phone].    We comply with applicable federal civil rights laws and [place] laws. We do not discriminate on the basis of race, color, national origin, age, disability, sex, sexual orientation, or gender identity.            Thank you!     Thank you for choosing [place] [facility] PHYSICAL THERAPY  for your care. Our goal is always to provide you with excellent care. Hearing back from our patients is one way we can continue to improve our services. Please take a few minutes to complete the written survey that you may receive in the mail after your visit with us. Thank you!             Your Updated Medication List - Protect others around you: Learn how to safely use, store and throw away your medicines at www.disposemymeds.org.          This list is accurate as of 7/10/18  9:05 AM.  Always use your most recent med list.                   Brand Name Dispense Instructions for use Diagnosis    ketoconazole 2 % shampoo    NIZORAL    120 mL    Apply to the affected area and wash off after 5 minutes.    Tinea versicolor

## 2018-07-10 NOTE — PROGRESS NOTES
Subjective:  HPI                    Objective:  System    Physical Exam    General     ROS    Assessment/Plan:    PROGRESS  REPORT    Progress reporting period is from eval to 7/10/18.       SUBJECTIVE  Subjective changes noted by patient:  .  Subjective: Pain had average 3/10 in recent days.  He feel TENS has helped decrease sensativity and increase tolerance for walking/standing later in the day when sx's have typically been worse    Current pain level is 3/10 Current Pain level: 3/10.     Previous pain level was   Initial Pain level: 3/10.   Changes in function:  Yes (See Goal flowsheet attached for changes in current functional level)  Adverse reaction to treatment or activity: None    OBJECTIVE  Changes noted in objective findings:  Yes,   Objective: ROM and strength WNL.       ASSESSMENT/PLAN  Updated problem list and treatment plan: Diagnosis 1:  Foot pain  Pain -  self management, education, directional preference exercise and home program  Decreased ROM/flexibility - manual therapy and therapeutic exercise  Impaired muscle performance - neuro re-education  Decreased function - therapeutic activities  STG/LTGs have been met or progress has been made towards goals:  Yes (See Goal flow sheet completed today.)  Assessment of Progress: The patient's progress has plateaued.  Self Management Plans:  Patient has been instructed in a home treatment program.  Patient is independent in a home treatment program.  I have re-evaluated this patient and find that the nature, scope, duration and intensity of the therapy is appropriate for the medical condition of the patient.    Recommendations:  This patient would benefit from further evaluation.    Please refer to the daily flowsheet for treatment today, total treatment time and time spent performing 1:1 timed codes.

## 2018-07-10 NOTE — Clinical Note
Elia Goldstein,  I hope all is well with you. You are seeing AJ next week.  He is just coming in for TENS treatment per MD orders for left foot neuritis targeting deep peroneal nerve.  Just wanted to give you a heads up as it's a bit of an uncommon treatment.  Take care.  Luis

## 2018-07-26 ENCOUNTER — MYC MEDICAL ADVICE (OUTPATIENT)
Dept: PODIATRY | Facility: CLINIC | Age: 28
End: 2018-07-26

## 2018-08-06 ENCOUNTER — OFFICE VISIT (OUTPATIENT)
Dept: SURGERY | Facility: CLINIC | Age: 28
End: 2018-08-06
Payer: COMMERCIAL

## 2018-08-06 VITALS
HEIGHT: 72 IN | SYSTOLIC BLOOD PRESSURE: 152 MMHG | WEIGHT: 156 LBS | HEART RATE: 74 BPM | DIASTOLIC BLOOD PRESSURE: 81 MMHG | BODY MASS INDEX: 21.13 KG/M2

## 2018-08-06 DIAGNOSIS — R10.31 RIGHT GROIN PAIN: Primary | ICD-10-CM

## 2018-08-06 PROBLEM — M25.572 CHRONIC PAIN OF LEFT ANKLE: Status: RESOLVED | Noted: 2018-06-18 | Resolved: 2018-08-06

## 2018-08-06 PROBLEM — M79.675 PAIN OF TOE OF LEFT FOOT: Status: RESOLVED | Noted: 2018-06-18 | Resolved: 2018-08-06

## 2018-08-06 PROBLEM — G89.29 CHRONIC PAIN OF LEFT ANKLE: Status: RESOLVED | Noted: 2018-06-18 | Resolved: 2018-08-06

## 2018-08-06 PROCEDURE — 99244 OFF/OP CNSLTJ NEW/EST MOD 40: CPT | Performed by: SURGERY

## 2018-08-06 NOTE — PROGRESS NOTES
[facility]  Surgery Consultation    Primary care provider:  Luke Priest [phone]  Consultation requested by: Luke Mcclellan MD    HPI: Patient is a 27-year-old gentleman referred by the above-mentioned provider for consultation regarding right groin pain.  He reports approximately 13 months ago he suffered an injury to his pubic area during the course of sexual activity.  Describes an episode where he was having sexual intercourse and his penis was bent downward and he felt immediate discomfort and pain in the right groin area.  This caused him to at the time lose his erection.  He was evaluated in urgent care and felt to have a muscular strain.  Over the course of the last 13 months he reports that he has had ongoing symptoms associated with pain with sexual activity.  He has had some burning with urination.  He has noted some shooting pain down his thigh.  He has had discomfort around the right hip joint.  He has been evaluated by general surgery/urology and now orthopedics.  He has been identified as having a labral tear within the right hip.  He describes no pain with coughing or sneezing.  He has no pain during the performance of crunches or sit ups.  No testicular discomfort.  He has been treated with physical therapy and acupuncture with no significant improvement.  He reports some degree of pain with prolonged sitting or getting out of a chair.    PMH:   has a past medical history of Joint pain and Tinea versicolor.  PSH:    has a past surgical history that includes no history of surgery.  Social History:   reports that he has quit smoking. His smoking use included Cigars. He has quit using smokeless tobacco. He reports that he drinks alcohol. He reports that he does not use illicit drugs.  Family History:  family history includes Cancer in his paternal grandfather and paternal grandmother; Cancer - colorectal in his maternal grandmother; Cardiovascular in his paternal grandfather;  Hypertension in his maternal grandfather and maternal grandmother; Osteoperosis in his paternal grandmother.  Medications/Allergies: Home medications and allergies reviewed.    ROS:  The 10 point Review of Systems is negative other than noted in the HPI.    Physical Exam:  /81  Pulse 74  Ht 6' (1.829 m)  Wt 156 lb (70.8 kg)  BMI 21.16 kg/m2  GENERAL: Generally appears well.  Psych: Alert and Oriented.  Normal affect  Eyes: Sclera clear  Respiratory:  Lungs clear to ausculation bilaterally with good air excursion  Cardiovascular:  Regular Rate and Rhythm with no murmurs gallops or rubs, normal peripheral pulses  GI: Abdomen Non Distended Non-Tender  No hernias palpated..  Groin- I examined the patient in both the standing and supine positions. Right Groin- No hernia Palpated.  No tenderness on palpation. Left Groin- No hernia Palpated.  No tenderness on palpation. No scrotal or testicle abnormalities.  He had no pain when performing a sit up or crunch.  No pain with straight leg raise.  Lymphatic/Hematologic/Immune:  No femoral or cervical lymphadenopathy.  Integumentary:  No rashes  Neurological: grossly intact     All new lab and imaging data was reviewed.     Impression and Plan:  Patient is a 27 year old male with right labral tear and other groin pathology/ sexual dysfunction related to above-mentioned injury.  No evidence to suggest or support the diagnosis of sports hernia or traditional hernia.    PLAN: I described to him the pathogenesis of sports hernias as well as how we identified these type of injuries.  He agreed that he does not have the symptoms that would support the diagnosis of sports hernia.  That being said I have suggested that he further investigate the ongoing issues associated with his hip and perhaps an additional opinion with urology would be helpful.  Beyond that I have nothing else to offer.  Would happily see him back again should there be a change in condition.    Thank you  very much for this consult.    Alexander Newell M.D.  [facility]  [phone]    Please route or send letter to:  Primary Care Provider (PCP) and Referring Provider

## 2018-08-06 NOTE — MR AVS SNAPSHOT
After Visit Summary   8/6/2018    Ricky Campoverde    MRN: [id number]           Patient Information     Date Of Birth          1990        Visit Information        Provider Department      8/6/2018 1:30 PM Alexander Newell MD [facility]      Today's Diagnoses     Right groin pain    -  1      Care Instructions    Patient to follow up with Primary Care provider regarding elevated blood pressure.          Follow-ups after your visit        Who to contact     If you have questions or need follow up information about today's clinic visit or your schedule please contact [facility] directly at [phone].  Normal or non-critical lab and imaging results will be communicated to you by SupportLocalhart, letter or phone within 4 business days after the clinic has received the results. If you do not hear from us within 7 days, please contact the clinic through SupportLocalhart or phone. If you have a critical or abnormal lab result, we will notify you by phone as soon as possible.  Submit refill requests through Dr[facility] Jerry[facility] [employer] Move or call your pharmacy and they will forward the refill request to us. Please allow 3 business days for your refill to be completed.          Additional Information About Your Visit        MyChart Information     Dr[facility] [employer] [facility] gives you secure access to your electronic health record. If you see a primary care provider, you can also send messages to your care team and make appointments. If you have questions, please call your primary care clinic.  If you do not have a primary care provider, please call [phone] and they will assist you.        Care EveryWhere ID     This is your Care EveryWhere ID. This could be used by other organizations to access your [facility] medical records  [id number]        Your Vitals Were     Pulse Height BMI (Body Mass Index)             74 6' (1.829 m) 21.16 kg/m2          Blood Pressure from Last 3  Encounters:   08/06/18 152/81   06/14/18 (!) 114/0   06/05/18 122/60    Weight from Last 3 Encounters:   08/06/18 156 lb (70.8 kg)   06/05/18 156 lb (70.8 kg)   05/29/18 160 lb (72.6 kg)              Today, you had the following     No orders found for display       Primary Care Provider Office Phone # Fax #    Luke Priest [phone] [address] [address] [facility] [address]        Equal Access to Services     [facility]: Hadii robyn delarosa hadasho Soalexi, waaxda luqadaha, qaybta kaalmada cara, dilcia beltran . So [facility] [phone].    ATENCIÓN: Si habla español, tiene a orozco disposición servicios gratuitos de asistencia lingüística. [facility] [phone].    We comply with applicable federal civil rights laws and [place] laws. We do not discriminate on the basis of race, color, national origin, age, disability, sex, sexual orientation, or gender identity.            Thank you!     Thank you for choosing [facility]  for your care. Our goal is always to provide you with excellent care. Hearing back from our patients is one way we can continue to improve our services. Please take a few minutes to complete the written survey that you may receive in the mail after your visit with us. Thank you!             Your Updated Medication List - Protect others around you: Learn how to safely use, store and throw away your medicines at www.disposemymeds.org.          This list is accurate as of 8/6/18  1:48 PM.  Always use your most recent med list.                   Brand Name Dispense Instructions for use Diagnosis    ketoconazole 2 % shampoo    NIZORAL    120 mL    Apply to the affected area and wash off after 5 minutes.    Tinea versicolor

## 2018-08-06 NOTE — LETTER
2018    RE: Ricky mckenna, : 1990         Primary care provider:  Luke Priest [phone]  Consultation requested by: Luke Mcclellan MD     HPI: Patient is a 27-year-old gentleman referred by the above-mentioned provider for consultation regarding right groin pain.  He reports approximately 13 months ago he suffered an injury to his pubic area during the course of sexual activity.  Describes an episode where he was having sexual intercourse and his penis was bent downward and he felt immediate discomfort and pain in the right groin area.  This caused him to at the time lose his erection.  He was evaluated in urgent care and felt to have a muscular strain.  Over the course of the last 13 months he reports that he has had ongoing symptoms associated with pain with sexual activity.  He has had some burning with urination.  He has noted some shooting pain down his thigh.  He has had discomfort around the right hip joint.  He has been evaluated by general surgery/urology and now orthopedics.  He has been identified as having a labral tear within the right hip.  He describes no pain with coughing or sneezing.  He has no pain during the performance of crunches or sit ups.  No testicular discomfort.  He has been treated with physical therapy and acupuncture with no significant improvement.  He reports some degree of pain with prolonged sitting or getting out of a chair.     PMH:   has a past medical history of Joint pain and Tinea versicolor.  PSH:    has a past surgical history that includes no history of surgery.  Social History:   reports that he has quit smoking. His smoking use included Cigars. He has quit using smokeless tobacco. He reports that he drinks alcohol. He reports that he does not use illicit drugs.  Family History:  family history includes Cancer in his paternal grandfather and paternal grandmother; Cancer - colorectal in his maternal grandmother; Cardiovascular in his paternal  grandfather; Hypertension in his maternal grandfather and maternal grandmother; Osteoperosis in his paternal grandmother.  Medications/Allergies: Home medications and allergies reviewed.     ROS:  The 10 point Review of Systems is negative other than noted in the HPI.     Physical Exam:  /81  Pulse 74  Ht 6' (1.829 m)  Wt 156 lb (70.8 kg)  BMI 21.16 kg/m2  GENERAL: Generally appears well.  Psych: Alert and Oriented.  Normal affect  Eyes: Sclera clear  Respiratory:  Lungs clear to ausculation bilaterally with good air excursion  Cardiovascular:  Regular Rate and Rhythm with no murmurs gallops or rubs, normal peripheral pulses  GI: Abdomen Non Distended Non-Tender  No hernias palpated..  Groin- I examined the patient in both the standing and supine positions. Right Groin- No hernia Palpated.  No tenderness on palpation. Left Groin- No hernia Palpated.  No tenderness on palpation. No scrotal or testicle abnormalities.  He had no pain when performing a sit up or crunch.  No pain with straight leg raise.  Lymphatic/Hematologic/Immune:  No femoral or cervical lymphadenopathy.  Integumentary:  No rashes  Neurological: grossly intact      All new lab and imaging data was reviewed.      Impression and Plan:  Patient is a 27 year old male with right labral tear and other groin pathology/ sexual dysfunction related to above-mentioned injury.  No evidence to suggest or support the diagnosis of sports hernia or traditional hernia.     PLAN: I described to him the pathogenesis of sports hernias as well as how we identified these type of injuries.  He agreed that he does not have the symptoms that would support the diagnosis of sports hernia.  That being said I have suggested that he further investigate the ongoing issues associated with his hip and perhaps an additional opinion with urology would be helpful.  Beyond that I have nothing else to offer.  Would happily see him back again should there be a change in  condition.     Thank you very much for this consult.     Alexander Newell M.D.  [facility]  [phone]

## 2018-08-28 ENCOUNTER — MYC MEDICAL ADVICE (OUTPATIENT)
Dept: PODIATRY | Facility: CLINIC | Age: 28
End: 2018-08-28

## 2019-12-09 ENCOUNTER — HEALTH MAINTENANCE LETTER (OUTPATIENT)
Age: 29
End: 2019-12-09

## 2021-01-15 ENCOUNTER — HEALTH MAINTENANCE LETTER (OUTPATIENT)
Age: 31
End: 2021-01-15

## 2021-10-24 ENCOUNTER — HEALTH MAINTENANCE LETTER (OUTPATIENT)
Age: 31
End: 2021-10-24

## 2022-02-13 ENCOUNTER — HEALTH MAINTENANCE LETTER (OUTPATIENT)
Age: 32
End: 2022-02-13

## 2022-10-15 ENCOUNTER — HEALTH MAINTENANCE LETTER (OUTPATIENT)
Age: 32
End: 2022-10-15

## 2023-03-26 ENCOUNTER — HEALTH MAINTENANCE LETTER (OUTPATIENT)
Age: 33
End: 2023-03-26

## 2023-10-05 ENCOUNTER — OFFICE VISIT (OUTPATIENT)
Dept: FAMILY MEDICINE | Facility: CLINIC | Age: 33
End: 2023-10-05
Payer: COMMERCIAL

## 2023-10-05 VITALS
TEMPERATURE: 97.9 F | SYSTOLIC BLOOD PRESSURE: 131 MMHG | HEIGHT: 72 IN | RESPIRATION RATE: 17 BRPM | WEIGHT: 169.9 LBS | OXYGEN SATURATION: 98 % | DIASTOLIC BLOOD PRESSURE: 83 MMHG | HEART RATE: 76 BPM | BODY MASS INDEX: 23.01 KG/M2

## 2023-10-05 DIAGNOSIS — Z11.4 SCREENING FOR HIV (HUMAN IMMUNODEFICIENCY VIRUS): Primary | ICD-10-CM

## 2023-10-05 DIAGNOSIS — R68.89 FLU-LIKE SYMPTOMS: ICD-10-CM

## 2023-10-05 DIAGNOSIS — Z11.59 NEED FOR HEPATITIS C SCREENING TEST: ICD-10-CM

## 2023-10-05 DIAGNOSIS — G93.39 OTHER POST INFECTION AND RELATED FATIGUE SYNDROMES: ICD-10-CM

## 2023-10-05 LAB
DEPRECATED S PYO AG THROAT QL EIA: NEGATIVE
GROUP A STREP BY PCR: NOT DETECTED

## 2023-10-05 PROCEDURE — 99204 OFFICE O/P NEW MOD 45 MIN: CPT | Performed by: FAMILY MEDICINE

## 2023-10-05 PROCEDURE — 87651 STREP A DNA AMP PROBE: CPT | Performed by: FAMILY MEDICINE

## 2023-10-05 ASSESSMENT — ENCOUNTER SYMPTOMS
FATIGUE: 1
FEVER: 1

## 2023-10-05 ASSESSMENT — PAIN SCALES - GENERAL: PAINLEVEL: NO PAIN (0)

## 2023-10-05 NOTE — PROGRESS NOTES
Assessment & Plan     Flu-like symptoms  -JAGRUTI is having flulike symptoms for the past 1-2 weeks.  -Mild sore throat, severe fatigue, congestion.  Denied productive cough/abdominal pain/dysuria.  Had diarrhea initially which resolved.  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  -Was feeling very tired for the past 2 days.  Today morning he is feeling better.  -Home COVID test negative x3.    PLAN:  -Likely viral prodrome.  -Since he started feeling slightly better from today morning, suggested to see how he does over the next 3 to 4 days.  -If he still feels tired,AJ will schedule lab visit to get labs done.  -Recommend hydration and supportive care.    - CBC with platelets; Future  - Comprehensive metabolic panel (BMP + Alb, Alk Phos, ALT, AST, Total. Bili, TP); Future  - Streptococcus A Rapid Screen w/Reflex to PCR - Clinic Collect    Other post infection and related fatigue syndromes  -Same as above.  Due to his excessive fatigue, ordered Lyme's and mono.  -If he feels better in the next 2-3 days, he can hold the test.    - Lyme Disease Total Abs Bld with Reflex to Confirm CLIA; Future  - Mononucleosis screen; Future    Screening for HIV (human immunodeficiency virus)    - HIV Antigen Antibody Combo; Future    Need for hepatitis C screening test    - Hepatitis C Screen Reflex to HCV RNA Quant and Genotype; Future               Theresa Mendes MD  [facility]    Subjective   JAGRUTI is a 33 year old, presenting for the following health issues:  Fatigue and Fever      10/5/2023    11:34 AM   Additional Questions   Roomed by Melanie   Accompanied by self       Fatigue  Associated symptoms include fatigue and a fever.   Fever  Associated symptoms include fatigue and a fever.   History of Present Illness       Reason for visit:  Fatigue, sore throat,runny nose,aches  Symptom onset:  1-2 weeks ago  Symptoms include:  Fatigue runny nose sore throat aches  Symptom  "intensity:  Moderate  Symptom progression:  Staying the same  Had these symptoms before:  Yes  Has tried/received treatment for these symptoms:  No  What makes it worse:  Exerting myself  What makes it better:  Napping    He eats 2-3 servings of fruits and vegetables daily.He consumes 0 sweetened beverage(s) daily.He exercises with enough effort to increase his heart rate 30 to 60 minutes per day.  He exercises with enough effort to increase his heart rate 5 days per week.   He is taking medications regularly.                 Review of Systems   Constitutional:  Positive for fatigue and fever.      Constitutional, HEENT, cardiovascular, pulmonary, gi and gu systems are negative, except as otherwise noted.      Objective    /83 (BP Location: Left arm, Patient Position: Sitting, Cuff Size: Adult Regular)   Pulse 76   Temp 97.9  F (36.6  C) (Oral)   Resp 17   Ht 1.829 m (6' 0.01\")   Wt 77.1 kg (169 lb 14.4 oz)   SpO2 98%   BMI 23.04 kg/m    Body mass index is 23.04 kg/m .  Physical Exam   GENERAL: healthy, alert and no distress  NECK: no adenopathy, no asymmetry, masses, or scars and thyroid normal to palpation  RESP: lungs clear to auscultation - no rales, rhonchi or wheezes  CV: regular rate and rhythm, normal S1 S2, no S3 or S4, no murmur, click or rub, no peripheral edema and peripheral pulses strong  ABDOMEN: soft, nontender, no hepatosplenomegaly, no masses and bowel sounds normal  MS: no gross musculoskeletal defects noted, no edema                      "

## 2024-05-26 ENCOUNTER — HEALTH MAINTENANCE LETTER (OUTPATIENT)
Age: 34
End: 2024-05-26

## 2025-06-14 ENCOUNTER — HEALTH MAINTENANCE LETTER (OUTPATIENT)
Age: 35
End: 2025-06-14